# Patient Record
Sex: FEMALE | Race: WHITE
[De-identification: names, ages, dates, MRNs, and addresses within clinical notes are randomized per-mention and may not be internally consistent; named-entity substitution may affect disease eponyms.]

---

## 2017-02-24 ENCOUNTER — HOSPITAL ENCOUNTER (OUTPATIENT)
Dept: HOSPITAL 45 - C.MAMM | Age: 67
Discharge: HOME | End: 2017-02-24
Attending: FAMILY MEDICINE
Payer: COMMERCIAL

## 2017-02-24 DIAGNOSIS — Z12.31: Primary | ICD-10-CM

## 2017-02-24 NOTE — MAMMOGRAPHY REPORT
BILATERAL DIGITAL SCREENING MAMMOGRAM WITH CAD: 2/24/2017



TECHNIQUE:  Current study was also evaluated with a Computer Aided Detection (CAD) system.  Bilatera
l CC and MLO views were obtained.



COMPARISON: Comparison is made to exams dated:  10/6/2015 mammogram, 6/17/2013 mammogram - [facility], and 10/14/2009.   



BREAST COMPOSITION:  There are scattered areas of fibroglandular density in both breasts.  



FINDINGS:  No suspicious masses, calcifications, or areas of architectural distortion are noted in e
ither breast. There has been no significant interval change compared to prior exams.  





IMPRESSION:  ACR BI-RADS CATEGORY 1: NEGATIVE

There is no mammographic evidence of malignancy. A 1 year screening mammogram is recommended.  The p
atient will receive written notification of the results.  





Approximately 10% of breast cancers are not detected with mammography. A negative mammographic repor
t should not delay biopsy if a clinically suggestive mass is present.



Meghana Montano M.D.          

ah/:2/24/2017 13:58:27  



Imaging Technologist: Joann FAY(CHICO)(ABIEL), [facility]

letter sent: Normal 1/2  

BI-RADS Code: ACR BI-RADS Category 1: Negative

## 2018-04-13 ENCOUNTER — HOSPITAL ENCOUNTER (INPATIENT)
Dept: HOSPITAL 45 - C.EDB | Age: 68
LOS: 1 days | Discharge: HOME | DRG: 65 | End: 2018-04-14
Attending: HOSPITALIST | Admitting: HOSPITALIST
Payer: COMMERCIAL

## 2018-04-13 VITALS
SYSTOLIC BLOOD PRESSURE: 129 MMHG | HEART RATE: 89 BPM | OXYGEN SATURATION: 96 % | TEMPERATURE: 98.78 F | DIASTOLIC BLOOD PRESSURE: 80 MMHG

## 2018-04-13 VITALS
SYSTOLIC BLOOD PRESSURE: 112 MMHG | OXYGEN SATURATION: 96 % | TEMPERATURE: 98.6 F | HEART RATE: 86 BPM | DIASTOLIC BLOOD PRESSURE: 75 MMHG

## 2018-04-13 VITALS
TEMPERATURE: 98.24 F | DIASTOLIC BLOOD PRESSURE: 87 MMHG | OXYGEN SATURATION: 95 % | SYSTOLIC BLOOD PRESSURE: 134 MMHG | HEART RATE: 94 BPM

## 2018-04-13 VITALS
OXYGEN SATURATION: 97 % | HEART RATE: 80 BPM | TEMPERATURE: 98.42 F | DIASTOLIC BLOOD PRESSURE: 98 MMHG | SYSTOLIC BLOOD PRESSURE: 168 MMHG

## 2018-04-13 VITALS
WEIGHT: 176.81 LBS | HEIGHT: 70 IN | HEIGHT: 70 IN | BODY MASS INDEX: 25.31 KG/M2 | BODY MASS INDEX: 25.31 KG/M2 | WEIGHT: 176.81 LBS

## 2018-04-13 VITALS — OXYGEN SATURATION: 94 %

## 2018-04-13 VITALS — OXYGEN SATURATION: 96 %

## 2018-04-13 DIAGNOSIS — I10: ICD-10-CM

## 2018-04-13 DIAGNOSIS — Z88.1: ICD-10-CM

## 2018-04-13 DIAGNOSIS — Z88.2: ICD-10-CM

## 2018-04-13 DIAGNOSIS — Z87.891: ICD-10-CM

## 2018-04-13 DIAGNOSIS — H81.01: ICD-10-CM

## 2018-04-13 DIAGNOSIS — Z79.899: ICD-10-CM

## 2018-04-13 DIAGNOSIS — E78.5: ICD-10-CM

## 2018-04-13 DIAGNOSIS — Z88.8: ICD-10-CM

## 2018-04-13 DIAGNOSIS — Z82.3: ICD-10-CM

## 2018-04-13 DIAGNOSIS — Z86.73: ICD-10-CM

## 2018-04-13 DIAGNOSIS — I63.8: Primary | ICD-10-CM

## 2018-04-13 DIAGNOSIS — G81.91: ICD-10-CM

## 2018-04-13 LAB
BASOPHILS # BLD: 0.02 K/UL (ref 0–0.2)
BASOPHILS NFR BLD: 0.3 %
BUN SERPL-MCNC: 12 MG/DL (ref 7–18)
CA-I BLD-SCNC: 1.2 MMOL/L (ref 1.12–1.32)
CALCIUM SERPL-MCNC: 8.9 MG/DL (ref 8.5–10.1)
CK MB SERPL-MCNC: 1.8 NG/ML (ref 0.5–3.6)
CO2 SERPL-SCNC: 30 MMOL/L (ref 21–32)
CREAT BLD-MCNC: 0.9 MG/DL (ref 0.6–1.3)
CREAT SERPL-MCNC: 0.8 MG/DL (ref 0.6–1.2)
EOS ABS #: 0.16 K/UL (ref 0–0.5)
EOSINOPHIL NFR BLD AUTO: 231 K/UL (ref 130–400)
GLUCOSE SERPL-MCNC: 95 MG/DL (ref 70–99)
HBA1C MFR BLD: 5.8 % (ref 4.5–5.6)
HCT VFR BLD CALC: 42 % (ref 37–47)
HGB BLD-MCNC: 14.5 G/DL (ref 12–16)
IG#: 0.01 K/UL (ref 0–0.02)
IMM GRANULOCYTES NFR BLD AUTO: 41 %
INR PPP: 1 (ref 0.9–1.1)
ISTAT POTASSIUM: 3.5 MEQ/L (ref 3.3–5)
LYMPHOCYTES # BLD: 2.37 K/UL (ref 1.2–3.4)
MCH RBC QN AUTO: 30.4 PG (ref 25–34)
MCHC RBC AUTO-ENTMCNC: 34.5 G/DL (ref 32–36)
MCV RBC AUTO: 88.1 FL (ref 80–100)
MONO ABS #: 0.42 K/UL (ref 0.11–0.59)
MONOCYTES NFR BLD: 7.3 %
NEUT ABS #: 2.8 K/UL (ref 1.4–6.5)
NEUTROPHILS # BLD AUTO: 2.8 %
NEUTROPHILS NFR BLD AUTO: 48.4 %
PMV BLD AUTO: 11 FL (ref 7.4–10.4)
POTASSIUM SERPL-SCNC: 4.2 MMOL/L (ref 3.5–5.1)
PTT PATIENT: 27.8 SECONDS (ref 21–31)
RED CELL DISTRIBUTION WIDTH CV: 12.7 % (ref 11.5–14.5)
RED CELL DISTRIBUTION WIDTH SD: 40.8 FL (ref 36.4–46.3)
SODIUM BLD-SCNC: 139 MEQ/L (ref 135–144)
SODIUM SERPL-SCNC: 135 MMOL/L (ref 136–145)
WBC # BLD AUTO: 5.78 K/UL (ref 4.8–10.8)

## 2018-04-13 RX ADMIN — ATORVASTATIN CALCIUM SCH MG: 40 TABLET, FILM COATED ORAL at 17:10

## 2018-04-13 RX ADMIN — POTASSIUM CHLORIDE SCH MEQ: 750 TABLET, EXTENDED RELEASE ORAL at 20:26

## 2018-04-13 NOTE — DIAGNOSTIC IMAGING REPORT
CT OF THE HEAD WITHOUT CONTRAST



CLINICAL HISTORY: Stroke    



COMPARISON STUDY:  Head CT Suad 15, 2015 and MRI of the brain October 16, 2015. 



CT DOSE: 786.26 mGy.cm



TECHNIQUE: Helical axial images of the head were obtained without IV contrast.

Automated exposure control was utilized for the study.  A dose lowering

technique was utilized adhering to the principles of ALARA.





FINDINGS: No acute intracranial hemorrhage, midline shift or mass effect is

present. Ventricular system is normal. Basilar cisterns are patent. 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

thalamic infarct is noted. There is an old infarct within the left centrum

semiovale. White matter hypodensities are unchanged. There are no findings to

suggest acute dural sinus thrombosis or acute territorial infarct. There are no

significant calvarial abnormalities. Visualized portions of the sinuses and

mastoid air cells are clear.



IMPRESSION:  No acute intracranial findings. 







Electronically signed by:  Tacho Michael M.D.

4/13/2018 9:52 AM



Dictated Date/Time:  4/13/2018 9:43 AM

## 2018-04-13 NOTE — DIAGNOSTIC IMAGING REPORT
CTA ANGIOGRAPHY OF THE HEAD



CLINICAL HISTORY: Stroke symptoms.    



COMPARISON STUDY:  MRA of the head October 16, 2015.



TECHNIQUE:  Helical axial images of the head were obtained following uneventful

intravenous administration of 121 cc of Optiray 320.  A dose lowering technique

was utilized adhering to the principles of ALARA.



FINDINGS: The CTA of the neck will be reported separately. The bilateral M1, M2,

A1 and A2 segments are patent. There is moderate plaque within bilateral

cavernous carotids without significant stenosis. No abrupt vessel cut off is

identified. Note is made of a tiny 2 mm aneurysm arising from the undersurface

of the supraclinoid right ICA. No additional intracranial aneurysms are

identified. The right vertebral artery is dominant. The left vertebral artery is

diminutive. This is unchanged. A large left posterior communicating artery is

noted. No acute intracranial hemorrhage, midline shift or mass effect is

present. Ventricular system is normal. Basilar cisterns are patent. No

extra-axial collections are present.







IMPRESSION:  



1. No abrupt vessel cut off identified.



2. Tiny 2 mm aneurysm arising from the supraclinoid right ICA. No additional

intracranial aneurysms.



3. Moderate plaque within the bilateral cavernous carotids without

hemodynamically significant stenosis.







Electronically signed by:  Tacho Michael M.D.

4/13/2018 10:04 AM



Dictated Date/Time:  4/13/2018 9:55 AM

## 2018-04-13 NOTE — DIAGNOSTIC IMAGING REPORT
CT ANGIOGRAM OF THE NECK



CLINICAL HISTORY: Strokelike symptoms.



COMPARISON STUDY:  No priors.



TECHNIQUE: Following the IV administration of 121 of Optiray 320, CT angiogram

of the neck was performed from the aortic arch to the skull base. Images are

reviewed in the axial, sagittal, and coronal planes. 3-D MIPS images are created

and assessed. IV contrast was administered without complication. All

measurements were calculated based on NASCET criteria.  A dose lowering

technique was utilized adhering to the principles of ALARA.



CT DOSE: 527.68 mGy.cm



FINDINGS:



Thoracic aorta: There is moderate carotid calcification of the thoracic aorta.

Visualized portions of the thoracic aorta are normal in caliber. The aortic arch

demonstrates standard 3-vessel anatomy.



Right carotid arterial system: The right common carotid artery is widely patent,

as are the right internal and external carotid arteries. Minimal ulcerated

plaque is noted in the carotid bulb.



Left carotid arterial system: The left common carotid artery is widely patent,

as are the left internal and external carotid arteries.



Vertebral arteries: The vertebral arteries are widely patent bilaterally, noting

right-sided dominance.



Intracranial vasculature: Intracranial vessels are patent at the skull base. The

left vertebral artery is diminutive.



Subclavian arteries: Widely patent bilaterally.



Jugular veins: Widely patent bilaterally.



Brain parenchyma: The visualized brain parenchyma the skull base is within

normal limits.



Lung apices: Partially visualized upper lobe lung parenchyma appears clear.



Soft tissues: The visualized pharyngeal soft tissues are normal in appearance

noting angiographic phase technique. The oropharyngeal airway appears widely

patent. The salivary glands are normal in appearance. There is a 5 mm

low-attenuation nodule in the right thyroid lobe. No cervical lymphadenopathy is

seen.



Skeletal structures: The visualized calvarium at the skull base appears intact.

The imaged cervical spine is within normal limits.





IMPRESSION: Unremarkable CT angiogram of the neck.







Electronically signed by:  South Brunson M.D.

4/13/2018 10:06 AM



Dictated Date/Time:  4/13/2018 9:56 AM

## 2018-04-13 NOTE — DIAGNOSTIC IMAGING REPORT
CHEST ONE VIEW PORTABLE



CLINICAL HISTORY: Stroke    



COMPARISON STUDY:  Chest radiograph October 16, 2015.



FINDINGS: Dextroscoliosis of the upper thoracic spine is incidentally noted. No

pneumothorax or pleural effusion is noted. There is no evidence for pulmonary

edema. Cardiomediastinal silhouette is unremarkable. Appearance of the chest is

unchanged. 



IMPRESSION:  No acute cardiopulmonary findings. 









Electronically signed by:  Tacho Michael M.D.

4/13/2018 10:07 AM



Dictated Date/Time:  4/13/2018 10:07 AM

## 2018-04-13 NOTE — NEUROLOGY CONSULTATION
Neurology Consultation


Date of Consultation:


Apr 13, 2018.


Attending Physician:


Brenda Moore DO


Primary Care Physician:


Gurwinder Godinez D.OAlvaro


Reason for Consultation:


right sided weakness, TIA vs CVA


History of Present Illness


Source:  patient


Ashley is a 68 year old  female PMH Meniere's,  disease right ear with hearing 

loss, HTN, ICH  who presents to the ER with chief complaint of right-sided 

weakness.  She woke up feeling at her baseline. she was walking through [place] 

with her  and her  right leg started to not feel right.  She felt as 

though maybe the floor was slippery.  She went home to eat breakfast and 

noticed she couldn't hold her coffee cup with her right hand. When she would 

walk she was veering to the right. While in the ED the symptoms slowly started 

to resolve and she feel back to her baseline accept she feels tired. ad CT as 

well as CTA head and neck without acute findings. Stroke alert was called but 

she was given a full dose aspirin only due to a PMH of hemorrhagic bleed. 

denies CP, SOB, abdominal pain, weakness, numbness tingling, N, V, vision 

changes, slurred speech, swallowing issues, falls





Social History


Alcohol Use:  occasionally


Housing Status:  lives with family





Allergies


Coded Allergies:  


     Amoxicillin (Verified  Adverse Reaction, Intermediate, nausea, 10/16/15)


     Clavulanic Acid (Verified  Adverse Reaction, Intermediate, nausea, 10/16/15

)


     Prednisone (Verified  Adverse Reaction, Intermediate, nausea, 10/16/15)


     Sulfa Antibiotics (Verified  Adverse Reaction, Intermediate, nausea, 10/16/

15)


     Doxycycline (Unverified  Adverse Reaction, Unknown, nausea, 10/16/15)





Current Inpatient Medications





Current Inpatient Medications








 Medications


  (Trade)  Dose


 Ordered  Sig/Soha


 Route  Start Time


 Stop Time Status Last Admin


Dose Admin


 


 Ioversol


  (Optiray 300)  121 ml  UD  PRN


 IV  4/13/18 10:00


 4/17/18 09:59   


 


 


 Acetaminophen


  (Tylenol Tab)  650 mg  Q4H  PRN


 PO  4/13/18 11:45


 5/13/18 11:44   


 


 


 Ondansetron HCl


  (Zofran Inj)  4 mg  Q6H  PRN


 IV  4/13/18 11:45


 5/13/18 11:44   


 


 


 Aspirin


  (Ecotrin Tab)  81 mg  QAM


 PO  4/14/18 09:00


 5/14/18 08:59   


 


 


 Atorvastatin


 Calcium


  (Lipitor Tab)  40 mg  QAM


 PO  4/13/18 12:00


 5/13/18 11:59   


 


 


 Miscellaneous


 Information


  (Pharmacist


 Discharge Med Rec


 Consult)  1 ea  UD  PRN


 N/A  4/13/18 12:00


 5/13/18 11:59   


 


 


 Calcium/Vitamin D


  (Caltrate Plus


 Tab)  1 tab  DAILY


 PO  4/14/18 09:00


 5/14/18 08:59   


 


 


 Meclizine HCl


  (Antivert Tab)  25 mg  DAILYBD


 PO  4/13/18 16:15


 5/13/18 16:14   


 


 


 Multivitamins


  (Multivitamin


 Tab)  1 tab  DAILY


 PO  4/14/18 09:00


 5/14/18 08:59   


 


 


 Meclizine HCl


  (Antivert Tab)  12.5 mg  DAILY


 PO  4/14/18 09:00


 5/14/18 08:59   


 


 


 Fish Oil


  (Omega-3


  (Purified Fish


 Oil) Cap)  1 gm  DAILY


 PO  4/14/18 09:00


 5/14/18 08:59   


 


 


 Potassium Chloride


  (Klor-Con M10)  10 meq  BID


 PO  4/13/18 21:00


 5/13/18 20:59   


 











Physical Exam


Vital Signs (Past 24 Hrs):











  Date Time  Temp Pulse Resp B/P (MAP) Pulse Ox O2 Delivery O2 Flow Rate FiO2


 


4/13/18 12:30 36.9 80 18 168/98 (121) 97 Room Air  


 


4/13/18 11:56  76 17 141/89 94 Room Air  


 


4/13/18 11:00     96 Room Air  


 


4/13/18 10:56  79 20 161/92 96 Room Air  


 


4/13/18 10:10  87 22 184/96 96 Room Air  


 


4/13/18 10:06  93      


 


4/13/18 09:59  88 18 177/98 95 Room Air  


 


4/13/18 09:53     97 Room Air  


 


4/13/18 09:29 36.6 95 20 156/93 96 Room Air  








Physical Exam:


Constitutional:  appearance nourished, healthy and normal


Ears, Nose, Mouth and Throat: mucous membranes moist, no injection and skin 

normal, eyes normal


Cardiovascular: normal S-1 and S-2 and regular rate and rhythm


Respiratory: clear to auscultation (CTA) and no rales, rhonchi or wheeze


Musculoskeletal: no peripheral edema and good distal pulses


Skin: no stigmata of neurocutaneous disease noted and normal and intact


Eyes: extraocular muscles intact (EOMI) and pupils equal, round and reactive to 

light (PERRL)





NEUROLOGIC EXAMINATION: 


Mental status: 


Alert and interactive


Oriented to full date and location


Oriented to person


Speech fluent with no evidence of aphasia





Cranial Nerves


smile eye brow raise symmetric





Reflexes:


Deep tendon reflexes were symmetrical and graded 2/5.  Plantar responses were 

flexor.





Sensory: 


cool touch, vibration





Coordination: 


Romberg absent, finger to nose with no bi pass





Gait/Stance:


Posture normal.  Gait normal: with steady with steps, base, turning, heel and 

toe walking (mild difficulty) and tandem gait.





Motor:


Negative for pronator drift of out stretched arms with eyes closed.





Strength:


biceps, triceps, hand  intrinsics, bilaterally 5/5, hip flex plantar flex 

ext 5/5 bilaterally





Laboratory Results


Past 24 Hours:


4/13/18 09:38








Red Blood Count 4.77, Mean Corpuscular Volume 88.1, Mean Corpuscular Hemoglobin 

30.4, Mean Corpuscular Hemoglobin Concent 34.5, Mean Platelet Volume 11.0, 

Neutrophils (%) (Auto) 48.4, Lymphocytes (%) (Auto) 41.0, Monocytes (%) (Auto) 

7.3, Eosinophils (%) (Auto) 2.8, Basophils (%) (Auto) 0.3, Neutrophils # (Auto) 

2.80, Lymphocytes # (Auto) 2.37, Monocytes # (Auto) 0.42, Eosinophils # (Auto) 

0.16, Basophils # (Auto) 0.02





4/13/18 09:56

















Test


  4/13/18


09:36 4/13/18


09:38 4/13/18


09:52 4/13/18


09:56


 


Bedside Hemoglobin


  15.0 g/dl


(12.0-16.0) 


  


  


 


 


Bedside Hematocrit 44 % (37-47)    


 


Bedside Sodium


  139 mEq/L


(135-144) 


  


  


 


 


Bedside Potassium


  3.5 mEq/L


(3.3-5.0) 


  


  


 


 


Bedside Chloride


  97 mEq/L


(101-112) 


  


  


 


 


Bedside Total CO2


  31 mEq/l


(24-31) 


  


  


 


 


Bedside Blood Urea Nitrogen


  12 mg/dl


(7-18) 


  


  


 


 


Bedside Creatinine


  0.9 mg/dl


(0.6-1.3) 


  


  


 


 


Bedside Glucose (other)


  111 mg/dl


(70-99) 


  


  


 


 


Bedside Ionized Calcium (Richard)


  1.20 mmol/l


(1.12-1.32) 


  


  


 


 


White Blood Count


  


  5.78 K/uL


(4.8-10.8) 


  


 


 


Red Blood Count


  


  4.77 M/uL


(4.2-5.4) 


  


 


 


Hemoglobin


  


  14.5 g/dL


(12.0-16.0) 


  


 


 


Hematocrit  42.0 % (37-47)   


 


Mean Corpuscular Volume


  


  88.1 fL


() 


  


 


 


Mean Corpuscular Hemoglobin


  


  30.4 pg


(25-34) 


  


 


 


Mean Corpuscular Hemoglobin


Concent 


  34.5 g/dl


(32-36) 


  


 


 


Platelet Count


  


  231 K/uL


(130-400) 


  


 


 


Mean Platelet Volume


  


  11.0 fL


(7.4-10.4) 


  


 


 


Neutrophils (%) (Auto)  48.4 %   


 


Lymphocytes (%) (Auto)  41.0 %   


 


Monocytes (%) (Auto)  7.3 %   


 


Eosinophils (%) (Auto)  2.8 %   


 


Basophils (%) (Auto)  0.3 %   


 


Neutrophils # (Auto)


  


  2.80 K/uL


(1.4-6.5) 


  


 


 


Lymphocytes # (Auto)


  


  2.37 K/uL


(1.2-3.4) 


  


 


 


Monocytes # (Auto)


  


  0.42 K/uL


(0.11-0.59) 


  


 


 


Eosinophils # (Auto)


  


  0.16 K/uL


(0-0.5) 


  


 


 


Basophils # (Auto)


  


  0.02 K/uL


(0-0.2) 


  


 


 


RDW Standard Deviation


  


  40.8 fL


(36.4-46.3) 


  


 


 


RDW Coefficient of Variation


  


  12.7 %


(11.5-14.5) 


  


 


 


Immature Granulocyte % (Auto)  0.2 %   


 


Immature Granulocyte # (Auto)


  


  0.01 K/uL


(0.00-0.02) 


  


 


 


Prothrombin Time


  


  10.7 SECONDS


(9.0-12.0) 


  


 


 


Prothromb Time International


Ratio 


  1.0 (0.9-1.1) 


  


  


 


 


Activated Partial


Thromboplast Time 


  27.8 SECONDS


(21.0-31.0) 


  


 


 


Partial Thromboplastin Ratio  1.1   


 


Estimated Average Glucose  120 mg/dl   


 


Hemoglobin A1c


  


  5.8 %


(4.5-5.6) 


  


 


 


Bedside Prothrombin Time INR   1.1 (0.9-1.1)  


 


Anion Gap


  


  


  


  4.0 mmol/L


(3-11)


 


Est Creatinine Clear Calc


Drug Dose 


  


  


  80.4 ml/min 


 


 


Estimated GFR (


American) 


  


  


  88.4 


 


 


Estimated GFR (Non-


American 


  


  


  76.3 


 


 


BUN/Creatinine Ratio    14.5 (10-20) 


 


Calcium Level


  


  


  


  8.9 mg/dl


(8.5-10.1)


 


Magnesium Level


  


  


  


  2.0 mg/dl


(1.8-2.4)


 


Total Creatine Kinase


  


  


  


  104 U/L


()


 


Creatine Kinase MB


  


  


  


  1.8 ng/ml


(0.5-3.6)


 


Creatine Kinase MB Ratio    1.7 (0-3.0) 


 


Troponin I


  


  


  


  < 0.015 ng/ml


(0-0.045)











Imaging


TTE- 


* Injection of contrast documented no interatrial shunt.


* The interatrial septum is intact with no evidence for an atrial septal defect.


* The left ventricle is normal in size.


* Left ventricular systolic function is normal.


* Ejection Fraction = 55-60%.


* The right ventricular systolic function is normal.


* The left atrial size is normal.


* Right atrial size is normal.


* No significant valvular pathology.


CTA head-. No abrupt vessel cut off identified.  Tiny 2 mm aneurysm arising 

from the supraclinoid right ICA. No additional intracranial aneurysms.  

Moderate plaque within the bilateral cavernous carotids without hemodynamically 

significant stenosis.


CTA neck- Unremarkable CT angiogram of the neck.


CT head- No acute intracranial findings





Impression


68 year old female with right sided weakness- resolved





Plan


1. PMH ICH- not on aspirin at home -plavix not started


2. MRI brain pending


3. CTA head and neck- no acute findings


4. TTE - no ASD


5. optimize HTN, DL LDL<70


6. PT/OT appears to be no ongoing need


7. further recommendations to follow








I have seen and discussed above patient with Dr Mi Whitfield, neurology


Pt seen and examined, Hx of what I presume to be a htn L thal hem. Pt denies 

residua. Had transient sx rue and rle.Suspect TIA, REc MRI, cardiovasc salazar. george/





MD Tia

## 2018-04-13 NOTE — ECHOCARDIOGRAM REPORT
*NOTICE TO RECEIVING PARTY AGENCY**  This information is strictly Confidential and protected under 
[place] law.  [place] law prohibits you from making any further disclosure of this 
information unless further disclosure is expressly permitted by the written consent of the person to 
whom it pertains or is authorized by law.  A general authorization for the release of medical or 
other information is not sufficient for this purpose.  Hospital accepts no responsibility if the 
information is made available to any other person, INCLUDING THE PATIENT.



Interpretation Summary

  *  Name: JENAE BARBER  Study Date: 2018 01:11 PM  BP: 168/98 mmHg

  *  MRN: [id number]  Patient Location: [facility]  HR: 80

  *  : 1950 (M/d/yyyy)  Gender: Female  Height: 70 in

  *  Age: 68 yrs  Ethnicity: [place]  Weight: 184 lb

  *  Ordering Physician: Albina Peterson

  *  Performed By: Gabrielle Aly RDCS

  *  Accession# [id number]  Account# [id number]

  *  Reason For Study: Cerebral Vascular Attack

  *  BSA: 2.0 m2

  *  -- Conclusions --

  *  Injection of contrast documented no interatrial shunt.

  *  The interatrial septum is intact with no evidence for an atrial septal defect.

  *  The left ventricle is normal in size.

  *  Left ventricular systolic function is normal.

  *  Ejection Fraction = 55-60%.

  *  The right ventricular systolic function is normal.

  *  The left atrial size is normal.

  *  Right atrial size is normal.

  *  No significant valvular pathology.

Procedure Details

  *  A complete two-dimensional transthoracic echocardiogram was performed (2D, M-mode, Doppler and 
color flow Doppler).

  *  A saline contrast injection was performed to assess for cardiac shunting.

  *  The injection was performed through an intravenous line in the left arm.

  *  The attending nurse who injected the saline contrast was Leann De Guzman RN.

  *  A total of 20 cc of agitated saline was given.

Left Ventricle

  *  The left ventricle is normal in size.

  *  There is normal left ventricular wall thickness.

  *  Ejection Fraction = 55-60%.

  *  Left ventricular systolic function is normal.

Right Ventricle

  *  The right ventricle is normal size.

  *  The right ventricular systolic function is normal.

Atria

  *  The left atrial size is normal.

  *  Right atrial size is normal.

  *  Injection of contrast documented no interatrial shunt.

  *  The interatrial septum is intact with no evidence for an atrial septal defect.

Mitral Valve

  *  The mitral valve anatomy is normal.

  *  Significant mitral regurgitation is absent.

Tricuspid Valve

  *  The tricuspid valve anatomy is normal.

  *  Significant tricuspid regurgitation is absent.

Aortic Valve

  *  The aortic valve is tricuspid.  The leaflet thickness if normal. There is no aortic stenosis, 
and no significant insufficiency.

Pulmonic Valve

  *  The pulmonic valve is not well visualized.

Great Vessels

  *  The aortic root and proximal ascending aorta are normal sized.

Pericardium/Pleural

  *  There is no pericardial effusion.



MMode 2D Measurements and Calculations

IVSd 0.85 cm

IVSs 0.93 cm



LVIDd 4.0 cm

LVIDs 2.8 cm

LVPWd 0.71 cm

LVPWs 1.4 cm



IVS/LVPW 1.2 

FS 29.9 %

EDV(Teich) 69.6 ml

ESV(Teich) 29.5 ml

EF(Teich) 57.6 %



EDV(cubed) 63.6 ml

ESV(cubed) 21.9 ml

EF(cubed) 65.5 %

% IVS thick 9.4 %

% LVPW thick 97.4 %





LV mass(C)d 90.1 grams

LV mass(C)dI 44.7 grams/m\S\2

LV mass(C)s 94.9 grams

LV mass(C)sI 47.1 grams/m\S\2



SV(Teich) 40.1 ml

SI(Teich) 19.9 ml/m\S\2

SV(cubed) 41.6 ml

SI(cubed) 20.7 ml/m\S\2



Ao root diam 2.9 cm

Ao root area 6.8 cm\S\2

ACS 2.0 cm

LA dimension 3.4 cm



LA/Ao 1.1 





LVAd ap4 25.2 cm\S\2

LVLd ap4 7.9 cm

EDV(MOD-sp4) 66.5 ml

EDV(sp4-el) 68.4 ml

LVAs ap4 14.7 cm\S\2

LVLs ap4 6.4 cm

ESV(MOD-sp4) 29.2 ml

ESV(sp4-el) 28.8 ml

EF(MOD-sp4) 56.1 %

EF(sp4-el) 57.9 %



LVAd ap2 22.2 cm\S\2

LVLd ap2 7.6 cm

EDV(MOD-sp2) 57.5 ml

EDV(sp2-el) 55.2 ml

LVAs ap2 13.0 cm\S\2

LVLs ap2 6.5 cm

ESV(MOD-sp2) 23.0 ml

ESV(sp2-el) 22.1 ml

EF(MOD-sp2) 60.0 %

EF(sp2-el) 59.9 %



LVLd %diff -3.98 %

EDV(MOD-bp) 62.2 ml

LVLs %diff 1.2 %

ESV(MOD-bp) 26.1 ml

EF(MOD-bp) 58.0 %



SV(MOD-sp4) 37.3 ml

SI(MOD-sp4) 18.5 ml/m\S\2





SV(MOD-sp2) 34.5 ml

SI(MOD-sp2) 17.1 ml/m\S\2



SV(MOD-bp) 36.1 ml

SI(MOD-bp) 17.9 ml/m\S\2



SV(sp4-el) 39.6 ml

SI(sp4-el) 19.7 ml/m\S\2



SV(sp2-el) 33.1 ml

SI(sp2-el) 16.4 ml/m\S\2







Doppler Measurements and Calculations

MV E max suleiman 62.3 cm/sec

MV A max suleiman 108.0 cm/sec



MV E/A 0.58 



MV dec time 0.32 sec



Ao V2 max 122.8 cm/sec

Ao max PG 6.0 mmHg

Ao max PG (full) 3.5 mmHg





LV V1 max PG 2.5 mmHg



LV V1 max 79.6 cm/sec



PA V2 max 64.2 cm/sec

PA max PG 1.6 mmHg



TR max suleiman 176.0 cm/sec

## 2018-04-13 NOTE — EMERGENCY ROOM VISIT NOTE
History


Report prepared by Gavin:  Loly Eldridge


Under the Supervision of:  Dr. Aj Rocha D.O.


First contact with patient:  09:30


Chief Complaint:  STROKE SYMPTOMS


Stated Complaint:  POSSIBLE STROKE SYMPTOMS





History of Present Illness


The patient is a 68 year old female who presents to the Emergency Room with 

complaints of sudden weakness in her right leg and right arm beginning at 8 am 

this morning, an hour and a half ago. The patient states "I believe I might be 

having a stroke".  She notes that at 8 AM she has weakness in her right upper 

and right lower extremity.  Nothing makes this better or worse.  She has 

trouble with fine motor.  She is able to ambulate but notes that she falls to 

the side.  She has never had this before.  This did come on suddenly and has 

been persistent since 8am.  per , the patient's speech sounds normal. 

The patient states she was walking through [employer] when she started to feel "off 

balance". Pt denies headache, change in vision, fevers, chest pain, shortness 

of breath, nausea, vomiting, diarrhea, pain with urination, and melena. The 

patient denies any history of A-fib. The patient notes a history of a brain 

bleed which she was life flighted to [facility] in 2006. She states she is 

unsure of what the cause of her brain bleed was. The patient had an MRI 

yesterday because of her history of Meniere's disease.





   Source of History:  patient


   Onset:  8 am


   Position:  other (right sided)


   Quality:  other (weakness)


   Timing:  other (sudden)


   Associated Symptoms:  + weakness, No fevers, No headache, No chest pain, No 

SOB, No nausea, No vomiting, No diarrhea





Review of Systems


See HPI for pertinent positives & negatives. A total of 10 systems reviewed and 

were otherwise negative.





Past Medical & Surgical


Medical Problems:


(1) Dyslipidemia


(2) Hypertension


(3) ICH (intracerebral hemorrhage)


(4) Mnire's disease


Surgical Problems:


(1) H/O dilation and curettage


(2) H/O tubal ligation


(3) Hx of appendectomy


(4) Hx of tonsillectomy








Family History





Cancer


Gallbladder disease


Hypertension





Social History


Smoking Status:  Former Smoker


Alcohol Use:  none


Marital Status:  


Housing Status:  lives with family


Occupation Status:  employed





Current/Historical Medications


Scheduled


Calcium/Vitamin D (Caltrate 600 Plus *), 1 TAB PO DAILY


Enalapril Maleate (Vasotec), 20 MG PO BID


Garlic (Garlic), 1 CAP PO BID


Hydrochlorothiazide (Hydrochlorothiazide), 25 MG PO DAILY


Meclizine HCl (Meclizine HCl), 1 TAB PO DAILY


Meclizine Hcl (Meclizine Hcl), 1 TAB PO DAILYBD


Mometasone Furoate (Nasal) (Nasonex), 2 SPRY YUNIEL DAILY


Multiple Vitamin (Multivitamin), 1 TAB PO DAILY


Omega-3 Fatty Acids (Omega-3), 500 MG PO DAILY


Plant Sterols And Stanols (Cholest Off), 2 TAB PO DAILY


Potassium Chloride (Potassium Chloride Er), 10 MEQ PO BID





Allergies


Coded Allergies:  


     Amoxicillin (Verified  Adverse Reaction, Intermediate, nausea, 10/16/15)


     Clavulanic Acid (Verified  Adverse Reaction, Intermediate, nausea, 10/16/15

)


     Prednisone (Verified  Adverse Reaction, Intermediate, nausea, 10/16/15)


     Sulfa Antibiotics (Verified  Adverse Reaction, Intermediate, nausea, 10/16/

15)


     Doxycycline (Unverified  Adverse Reaction, Unknown, nausea, 10/16/15)





Physical Exam


Vital Signs











  Date Time  Temp Pulse Resp B/P (MAP) Pulse Ox O2 Delivery O2 Flow Rate FiO2


 


4/13/18 11:00     96 Room Air  


 


4/13/18 10:56  79 20 161/92 96 Room Air  


 


4/13/18 10:10  87 22 184/96 96 Room Air  


 


4/13/18 10:06  93      


 


4/13/18 09:59  88 18 177/98 95 Room Air  


 


4/13/18 09:53     97 Room Air  


 


4/13/18 09:29 36.6 95 20 156/93 96 Room Air  











Physical Exam


GENERAL: Sitting up in bed, alert, anxious appearing, well nourished, no 

distress, non-toxic 


EYE EXAM: normal conjunctiva. PERRL and EOM's intact.


OROPHARYNX: no exudate, no erythema, lips, buccal mucosa, and tongue normal and 

mucous membranes are moist


NECK: supple, no nuchal rigidity, no adenopathy, non-tender


LUNGS: Clear to auscultation. Normal chest wall mechanics


HEART: no murmurs, S1 normal and S2 normal 


ABDOMEN: abdomen soft, non-tender, normo-active bowel sounds, no masses, no 

rebound or guarding. 


BACK: Back is symmetrical on inspection and there is no deformity, no midline 

tenderness, no CVA tenderness. 


SKIN: no rashes and no bruising 


UPPER EXTREMITIES: upper extremities are grossly normal. 


LOWER EXTREMITIES: No pitting edema. 


NEURO EXAM:Awake, alert, following commands, slight weakness with flexion and 

extension and grasp of right upper extremity, positive drift on right. Slight 

weakness with flexion in right hip and with plantar and dorsal flexion.  No 

weakness noted on left side.  Cranial nerves II through XII are intact.





Medical Decision & Procedures


ER Provider


Diagnostic Interpretation:


MRI IAC WITHOUT AND WITH CONTRAST from 4/12/18


Impression: 


1. No evidence for abnormality in each temporal bone region.


2. Chronic microvascular changes 


3. Sequela of remote hemorrhage in the left basal ganglia and posterior limb on 

prior MRI 2006 study.  Adjacent unchanged gliosis in the the left corona 

radiata. 





Radiology results as stated below per my review and the radiologist's 

interpretation: 





CTA ANGIOGRAPHY OF THE HEAD





CLINICAL HISTORY: Stroke symptoms.    





COMPARISON STUDY:  MRA of the head October 16, 2015.





TECHNIQUE:  Helical axial images of the head were obtained following uneventful


intravenous administration of 121 cc of Optiray 320.  A dose lowering technique


was utilized adhering to the principles of ALARA.





FINDINGS: The CTA of the neck will be reported separately. The bilateral M1, M2,


A1 and A2 segments are patent. There is moderate plaque within bilateral


cavernous carotids without significant stenosis. No abrupt vessel cut off is


identified. Note is made of a tiny 2 mm aneurysm arising from the undersurface


of the supraclinoid right ICA. No additional intracranial aneurysms are


identified. The right vertebral artery is dominant. The left vertebral artery is


diminutive. This is unchanged. A large left posterior communicating artery is


noted. No acute intracranial hemorrhage, midline shift or mass effect is


present. Ventricular system is normal. Basilar cisterns are patent. No


extra-axial collections are present.











IMPRESSION:  





1. No abrupt vessel cut off identified.





2. Tiny 2 mm aneurysm arising from the supraclinoid right ICA. No additional


intracranial aneurysms.





3. Moderate plaque within the bilateral cavernous carotids without


hemodynamically significant stenosis.











Electronically signed by:  Tacho Michael M.D.





CT ANGIOGRAM OF THE NECK





CLINICAL HISTORY: Strokelike symptoms.





COMPARISON STUDY:  No priors.





TECHNIQUE: Following the IV administration of 121 of Optiray 320, CT angiogram


of the neck was performed from the aortic arch to the skull base. Images are


reviewed in the axial, sagittal, and coronal planes. 3-D MIPS images are created


and assessed. IV contrast was administered without complication. All


measurements were calculated based on NASCET criteria.  A dose lowering


technique was utilized adhering to the principles of ALARA.





CT DOSE: 527.68 mGy.cm





FINDINGS:





Thoracic aorta: There is moderate carotid calcification of the thoracic aorta.


Visualized portions of the thoracic aorta are normal in caliber. The aortic arch


demonstrates standard 3-vessel anatomy.





Right carotid arterial system: The right common carotid artery is widely patent,


as are the right internal and external carotid arteries. Minimal ulcerated


plaque is noted in the carotid bulb.





Left carotid arterial system: The left common carotid artery is widely patent,


as are the left internal and external carotid arteries.





Vertebral arteries: The vertebral arteries are widely patent bilaterally, noting


right-sided dominance.





Intracranial vasculature: Intracranial vessels are patent at the skull base. The


left vertebral artery is diminutive.





Subclavian arteries: Widely patent bilaterally.





Jugular veins: Widely patent bilaterally.





Brain parenchyma: The visualized brain parenchyma the skull base is within


normal limits.





Lung apices: Partially visualized upper lobe lung parenchyma appears clear.





Soft tissues: The visualized pharyngeal soft tissues are normal in appearance


noting angiographic phase technique. The oropharyngeal airway appears widely


patent. The salivary glands are normal in appearance. There is a 5 mm


low-attenuation nodule in the right thyroid lobe. No cervical lymphadenopathy is


seen.





Skeletal structures: The visualized calvarium at the skull base appears intact.


The imaged cervical spine is within normal limits.








IMPRESSION: Unremarkable CT angiogram of the neck.








Electronically signed by:  South Brunson M.D.





CHEST ONE VIEW PORTABLE





CLINICAL HISTORY: Stroke    





COMPARISON STUDY:  Chest radiograph October 16, 2015.





FINDINGS: Dextroscoliosis of the upper thoracic spine is incidentally noted. No


pneumothorax or pleural effusion is noted. There is no evidence for pulmonary


edema. Cardiomediastinal silhouette is unremarkable. Appearance of the chest is


unchanged. 





IMPRESSION:  No acute cardiopulmonary findings. 














Electronically signed by:  Tacho Michael M.D.








CT OF THE HEAD WITHOUT CONTRAST





CLINICAL HISTORY: Stroke    





COMPARISON STUDY:  Head CT Suad 15, 2015 and MRI of the brain October 16, 2015. 





CT DOSE: 786.26 mGy.cm





TECHNIQUE: Helical axial images of the head were obtained without IV contrast.


Automated exposure control was utilized for the study.  A dose lowering


technique was utilized adhering to the principles of ALARA.








FINDINGS: No acute intracranial hemorrhage, midline shift or mass effect is


present. Ventricular system is normal. Basilar cisterns are patent. An old left


thalamic infarct is noted. There is an old infarct within the left centrum


semiovale. White matter hypodensities are unchanged. There are no findings to


suggest acute dural sinus thrombosis or acute territorial infarct. There are no


significant calvarial abnormalities. Visualized portions of the sinuses and


mastoid air cells are clear.





IMPRESSION:  No acute intracranial findings. 











Electronically signed by:  Tacho Michael M.D.





Laboratory Results


4/13/18 09:38








Red Blood Count 4.77, Mean Corpuscular Volume 88.1, Mean Corpuscular Hemoglobin 

30.4, Mean Corpuscular Hemoglobin Concent 34.5, Mean Platelet Volume 11.0, 

Neutrophils (%) (Auto) 48.4, Lymphocytes (%) (Auto) 41.0, Monocytes (%) (Auto) 

7.3, Eosinophils (%) (Auto) 2.8, Basophils (%) (Auto) 0.3, Neutrophils # (Auto) 

2.80, Lymphocytes # (Auto) 2.37, Monocytes # (Auto) 0.42, Eosinophils # (Auto) 

0.16, Basophils # (Auto) 0.02





4/13/18 09:56

















Test


  4/13/18


09:36 4/13/18


09:38 4/13/18


09:52 4/13/18


09:56


 


Bedside Hemoglobin


  15.0 g/dl


(12.0-16.0) 


  


  


 


 


Bedside Hematocrit 44 % (37-47)    


 


Bedside Sodium


  139 mEq/L


(135-144) 


  


  


 


 


Bedside Potassium


  3.5 mEq/L


(3.3-5.0) 


  


  


 


 


Bedside Chloride


  97 mEq/L


(101-112) 


  


  


 


 


Bedside Total CO2


  31 mEq/l


(24-31) 


  


  


 


 


Bedside Blood Urea Nitrogen


  12 mg/dl


(7-18) 


  


  


 


 


Bedside Creatinine


  0.9 mg/dl


(0.6-1.3) 


  


  


 


 


Bedside Glucose (other)


  111 mg/dl


(70-99) 


  


  


 


 


Bedside Ionized Calcium (Richard)


  1.20 mmol/l


(1.12-1.32) 


  


  


 


 


White Blood Count


  


  5.78 K/uL


(4.8-10.8) 


  


 


 


Red Blood Count


  


  4.77 M/uL


(4.2-5.4) 


  


 


 


Hemoglobin


  


  14.5 g/dL


(12.0-16.0) 


  


 


 


Hematocrit  42.0 % (37-47)   


 


Mean Corpuscular Volume


  


  88.1 fL


() 


  


 


 


Mean Corpuscular Hemoglobin


  


  30.4 pg


(25-34) 


  


 


 


Mean Corpuscular Hemoglobin


Concent 


  34.5 g/dl


(32-36) 


  


 


 


Platelet Count


  


  231 K/uL


(130-400) 


  


 


 


Mean Platelet Volume


  


  11.0 fL


(7.4-10.4) 


  


 


 


Neutrophils (%) (Auto)  48.4 %   


 


Lymphocytes (%) (Auto)  41.0 %   


 


Monocytes (%) (Auto)  7.3 %   


 


Eosinophils (%) (Auto)  2.8 %   


 


Basophils (%) (Auto)  0.3 %   


 


Neutrophils # (Auto)


  


  2.80 K/uL


(1.4-6.5) 


  


 


 


Lymphocytes # (Auto)


  


  2.37 K/uL


(1.2-3.4) 


  


 


 


Monocytes # (Auto)


  


  0.42 K/uL


(0.11-0.59) 


  


 


 


Eosinophils # (Auto)


  


  0.16 K/uL


(0-0.5) 


  


 


 


Basophils # (Auto)


  


  0.02 K/uL


(0-0.2) 


  


 


 


RDW Standard Deviation


  


  40.8 fL


(36.4-46.3) 


  


 


 


RDW Coefficient of Variation


  


  12.7 %


(11.5-14.5) 


  


 


 


Immature Granulocyte % (Auto)  0.2 %   


 


Immature Granulocyte # (Auto)


  


  0.01 K/uL


(0.00-0.02) 


  


 


 


Prothrombin Time


  


  10.7 SECONDS


(9.0-12.0) 


  


 


 


Prothromb Time International


Ratio 


  1.0 (0.9-1.1) 


  


  


 


 


Activated Partial


Thromboplast Time 


  27.8 SECONDS


(21.0-31.0) 


  


 


 


Partial Thromboplastin Ratio  1.1   


 


Estimated Average Glucose  120 mg/dl   


 


Hemoglobin A1c


  


  5.8 %


(4.5-5.6) 


  


 


 


Bedside Prothrombin Time INR   1.1 (0.9-1.1)  


 


Anion Gap


  


  


  


  4.0 mmol/L


(3-11)


 


Est Creatinine Clear Calc


Drug Dose 


  


  


  80.4 ml/min 


 


 


Estimated GFR (


American) 


  


  


  88.4 


 


 


Estimated GFR (Non-


American 


  


  


  76.3 


 


 


BUN/Creatinine Ratio    14.5 (10-20) 


 


Calcium Level


  


  


  


  8.9 mg/dl


(8.5-10.1)


 


Magnesium Level


  


  


  


  2.0 mg/dl


(1.8-2.4)


 


Total Creatine Kinase


  


  


  


  104 U/L


()


 


Creatine Kinase MB


  


  


  


  1.8 ng/ml


(0.5-3.6)


 


Creatine Kinase MB Ratio    1.7 (0-3.0) 


 


Troponin I


  


  


  


  < 0.015 ng/ml


(0-0.045)











Medications Administered











 Medications


  (Trade)  Dose


 Ordered  Sig/Soha


 Route  Start Time


 Stop Time Status Last Admin


Dose Admin


 


 Sodium Chloride  1,000 ml @ 


 50 mls/hr  Q20H


 IV  4/13/18 09:33


 4/13/18 13:00 DC 4/13/18 10:02


50 MLS/HR


 


 Aspirin


  (Aspirin Chew)  324 mg  NOW  STAT


 PO  4/13/18 10:15


 4/13/18 10:16 DC 4/13/18 10:22


324 MG











ECG Per My Interpretation


Indication:  weakness


Rate (beats per minute):  85


Rhythm:  normal sinus


Findings:  other (normal axis, poor baseline in inferior and lateal, 

questionable st depression in lateral )





ED Course


ED COURSE: 


Vital signs were reviewed and showed hypertensive


The patients medical record was reviewed


The above diagnostic studies were performed and reviewed.


ED treatments and interventions as stated above. 





0931: The patient was evaluated in room A12B. A complete history and physical 

examination was performed. Review of EMR shows the patient had a left thalamic 

bleed in 2006. 





0933: Ordered Sodium Chloride 1000 ml @ 50 mls/hr IV. 





0947: I reviewed the patient's case with Dr. Ji Fitzgerald. 





1013: I reviewed the patient's case with Dr. Ji Fitzgerald. She 

said not to give the patient tPA due to her previous brain bleed. 





1015: Ordered Aspirin 324 mg PO. 





1024: I updated the patient on her test results. 





1038: I reviewed the patient's case with Dr. Eddie Lerner.  He will 

evaluate the patient for further management.





1045: Upon reevaluation, the patient is resting.I discussed my findings with 

the patient and she understands and agrees with the treatment plan.   


Based on the patients age, coexisting illnesses, exam and lab findings the 

decision to treat as an inpatient was made.


The patient remained stable while under my care.


The patient will be evaluated for further management.





Medical Decision


Differential Diagnosis includes but is not limited to ischemic Stroke, 

hemorrhagic stroke, bells palsy, mass, neoplasm, migraine headache, seizure, 

subarachnoid hemorrhage, TIA, and transient global amnesia.  





Patient is a 68-year-old female who presents the ER for sudden onset of right-

sided weakness at 8 AM.  Upon arrival stroke alert was called.  Patient was 

taken to the CAT scan where CT head was performed.  Her history with a previous 

hemorrhagic CVA in 2006 was reviewed.  I do not believe that this was a 

hypertensive bleed as her blood pressures at that time were 150s.  Because of 

this I did perform a CTA is a favored she would likely not be a thrombolytic 

candidate.  Did discuss with [facility].  Patient was evaluated at 

bedside by them.  Decision was made in combination with the patient myself and 

[facility] not to give TPA with the previous brain bleed.  CBC along 

with BMP, LFTs, bilirubin and troponin was negative.  EKG had a poor baseline 

but nonischemic.  INR was normal.  Patient was given fluids and aspirin 

following passing the swallowing screen.  Patient was admitted to internal 

medicine with a CVA.





Medication Reconcilliation


Current Medication List:  was personally reviewed by me





Blood Pressure Screening


Patient's blood pressure:  Elevated blood pressure


Blood pressure disposition:  Elevated BP felt to be situational





Consults


Time Called:  0942


Consulting Physician:  Dr. Workman Telestroke


Returned Call:  [phone]


I reviewed the patient's case with Dr. Workman Telestroke.


Additional Consults:  


   Time Called:  1030


   Consulted Physician:  Dr. Eddie Lerner


   Returned Call:  [phone]


Additional Comments:


I reviewed the patient's case with Dr. Eddie Lerner.  He will evaluate 

the patient for further management.





Impression





 Primary Impression:  


 CVA (cerebral vascular accident)





Scribe Attestation


The scribe's documentation has been prepared under my direction and personally 

reviewed by me in its entirety. I confirm that the note above accurately 

reflects all work, treatment, procedures, and medical decision making performed 

by me.





Departure Information


Dispostion


Being Evaluated By Hospitalist





Referrals


Gurwinder Godinez D.O. (PCP)





Patient Instructions


My [facility]





Stroke History


Time Last Known Well


8 am





Stroke t-PA Criteria Reviewed


Does NOT meet criteria for t-PA





Reason t-PA Not Given


Contraindicated





Problem Qualifiers








 Primary Impression:  


 CVA (cerebral vascular accident)


 CVA mechanism:  unspecified  Qualified Codes:  I63.9 - Cerebral infarction, 

unspecified

## 2018-04-13 NOTE — HISTORY AND PHYSICAL
History & Physical


Date & Time of Service:


Apr 13, 2018 ~ 11:15


Chief Complaint:


Right Sided Weakness


Primary Care Physician:


Gurwinder Godinez D.O.


History of Present Illness


68-year-old female who presents to the ER with chief complaint of right-sided 

weakness.  Patient reports she woke up feeling in her usual state of health.  

She was walking through a store when she reports her right leg started to not 

feel right.  She felt as though maybe the floor was slippery.  She then went 

home and noticed that her right arm started to become weak.  She had trouble 

holding her coffee cup with her right hand.  She also reports some difficulty 

walking noting that her right leg was weak and that she was falling to the 

right side.  Patient denies slurred speech, facial droop, or dysphasia.  No 

headache or blurred vision.  She denies chest pain and shortness of breath.  No 

lightheadedness, dizziness, diaphoresis, or syncopal events.  She denies 

abdominal pain, nausea, vomiting, diarrhea.  No other recent illnesses, fevers, 

or chills.  She denies any urinary symptoms.  In the ED symptoms have slowly 

started to resolve.  Patient reports she has almost back to her baseline.  

Patient underwent head CT as well as CTA head and neck without acute findings.  

Alert was called however TPA was not given due to the patient's history of 

intracranial hemorrhage in the past.  Vital signs remained stable.  Patient was 

given full dose aspirin.





Past Medical/Surgical History


Medical Problems:


(1) Dyslipidemia


Status: Chronic  





(2) Hypertension


Status: Chronic  





(3) ICH (intracerebral hemorrhage)


Status: Resolved  





(4) Mnire's disease


Status: Chronic  





Surgical Problems:


(1) H/O dilation and curettage


Status: Chronic  





(2) H/O tubal ligation


Status: Chronic  





(3) Hx of appendectomy


Status: Chronic  





(4) Hx of tonsillectomy


Status: Chronic  











Family History





CVA


  BROTHER


FH: colon cancer


  FATHER


FH: pancreatic cancer


  MOTHER





Social History


Smoking Status:  Former Smoker


Alcohol Use:  occasionally





Immunizations


History of Influenza Vaccine:  Yes


Influenza Vaccine Date:  Oct 15, 2017


History of Tetanus Vaccine?:  Yes


Tetanus Immunization Date:  Aug 12, 2008


History of Pneumococcal:  Yes (2/10/17)


Pneumococcal Date:  Feb 10, 2017





Allergies


Coded Allergies:  


     Amoxicillin (Verified  Adverse Reaction, Intermediate, nausea, 10/16/15)


     Clavulanic Acid (Verified  Adverse Reaction, Intermediate, nausea, 10/16/15

)


     Prednisone (Verified  Adverse Reaction, Intermediate, nausea, 10/16/15)


     Sulfa Antibiotics (Verified  Adverse Reaction, Intermediate, nausea, 10/16/

15)


     Doxycycline (Unverified  Adverse Reaction, Unknown, nausea, 10/16/15)





Home Medications


Scheduled


Calcium/Vitamin D (Caltrate 600 Plus *), 1 TAB PO DAILY


Enalapril Maleate (Vasotec), 20 MG PO BID


Garlic (Garlic), 1 CAP PO BID


Hydrochlorothiazide (Hydrochlorothiazide), 25 MG PO DAILY


Meclizine HCl (Meclizine HCl), 1 TAB PO QAM


Meclizine Hcl (Meclizine Hcl), 1 TAB PO QPM


Mometasone Furoate (Nasal) (Nasonex), 2 SPRY YUNIEL DAILY


Multiple Vitamin (Multivitamin), 1 TAB PO DAILY


Omega-3 Fatty Acids (Omega-3), 500 MG PO DAILY


Plant Sterols And Stanols (Cholest Off), 2 TAB PO DAILY


Potassium Chloride (Potassium Chloride Er), 10 MEQ PO BID





Review of Systems


ROS per HPI, all other systems reviewed and negative





Physical Exam


Vital Signs











  Date Time  Temp Pulse Resp B/P (MAP) Pulse Ox O2 Delivery O2 Flow Rate FiO2


 


4/13/18 12:30 36.9 80 18 168/98 (121) 97 Room Air  


 


4/13/18 11:56  76 17 141/89 94 Room Air  


 


4/13/18 11:00     96 Room Air  


 


4/13/18 10:56  79 20 161/92 96 Room Air  


 


4/13/18 10:10  87 22 184/96 96 Room Air  


 


4/13/18 10:06  93      


 


4/13/18 09:59  88 18 177/98 95 Room Air  


 


4/13/18 09:53     97 Room Air  


 


4/13/18 09:29 36.6 95 20 156/93 96 Room Air  








General Appearance:  WD/WN, no apparent distress


Head:  normocephalic, atraumatic


Eyes:  normal inspection, PERRL, EOMI, sclerae normal


ENT:  hearing grossly normal, + pertinent finding (Mucous membranes moist)


Neck:  supple, no JVD, no carotid bruits


Respiratory/Chest:  lungs clear, normal breath sounds, no respiratory distress


Cardiovascular:  regular rate, rhythm, no edema, normal peripheral pulses


Abdomen/GI:  normal bowel sounds, non tender, soft, no organomegaly


Extremities/Musculoskelatal:  normal inspection, no calf tenderness, normal 

capillary refill


Neurologic/Psych:  alert, normal mood/affect, oriented x 3, + pertinent finding 

(Right lower extremity strength 4/5; otherwise no focal deficits noted)


Skin:  normal color, warm/dry





Diagnostics


Laboratory Results





Results Past 24 Hours








Test


  4/13/18


09:36 4/13/18


09:38 4/13/18


09:52 4/13/18


09:56 Range/Units


 


 


Bedside Hemoglobin 15.0    12.0-16.0  g/dl


 


Bedside Hematocrit 44    37-47  %


 


Bedside Sodium 139    135-144  mEq/L


 


Bedside Potassium 3.5    3.3-5.0  mEq/L


 


Bedside Chloride 97    101-112  mEq/L


 


Bedside Total CO2 31    24-31  mEq/l


 


Anion Gap 15.0   4.0 3-11  mmol/L


 


Bedside Blood Urea Nitrogen 12    7-18  mg/dl


 


Bedside Creatinine 0.9    0.6-1.3  mg/dl


 


Bedside Glucose (other) 111    70-99  mg/dl


 


Bedside Ionized Calcium (Richard)


  1.20


  


  


  


  1.12-1.32


mmol/l


 


White Blood Count  5.78   4.8-10.8  K/uL


 


Red Blood Count  4.77   4.2-5.4  M/uL


 


Hemoglobin  14.5   12.0-16.0  g/dL


 


Hematocrit  42.0   37-47  %


 


Mean Corpuscular Volume  88.1     fL


 


Mean Corpuscular Hemoglobin  30.4   25-34  pg


 


Mean Corpuscular Hemoglobin


Concent 


  34.5


  


  


  32-36  g/dl


 


 


Platelet Count  231   130-400  K/uL


 


Mean Platelet Volume  11.0   7.4-10.4  fL


 


Neutrophils (%) (Auto)  48.4    %


 


Lymphocytes (%) (Auto)  41.0    %


 


Monocytes (%) (Auto)  7.3    %


 


Eosinophils (%) (Auto)  2.8    %


 


Basophils (%) (Auto)  0.3    %


 


Neutrophils # (Auto)  2.80   1.4-6.5  K/uL


 


Lymphocytes # (Auto)  2.37   1.2-3.4  K/uL


 


Monocytes # (Auto)  0.42   0.11-0.59  K/uL


 


Eosinophils # (Auto)  0.16   0-0.5  K/uL


 


Basophils # (Auto)  0.02   0-0.2  K/uL


 


RDW Standard Deviation  40.8   36.4-46.3  fL


 


RDW Coefficient of Variation  12.7   11.5-14.5  %


 


Immature Granulocyte % (Auto)  0.2    %


 


Immature Granulocyte # (Auto)  0.01   0.00-0.02  K/uL


 


Prothrombin Time


  


  10.7


  


  


  9.0-12.0


SECONDS


 


Prothromb Time International


Ratio 


  1.0


  


  


  0.9-1.1  


 


 


Activated Partial


Thromboplast Time 


  27.8


  


  


  21.0-31.0


SECONDS


 


Partial Thromboplastin Ratio  1.1    


 


Estimated Average Glucose  120    mg/dl


 


Hemoglobin A1c  5.8   4.5-5.6  %


 


Bedside Prothrombin Time INR   1.1  0.9-1.1  


 


Sodium Level    135 136-145  mmol/L


 


Potassium Level    4.2 3.5-5.1  mmol/L


 


Chloride Level    101   mmol/L


 


Carbon Dioxide Level    30 21-32  mmol/L


 


Blood Urea Nitrogen    12 7-18  mg/dl


 


Creatinine


  


  


  


  0.80


  0.60-1.20


mg/dl


 


Est Creatinine Clear Calc


Drug Dose 


  


  


  80.4


   ml/min


 


 


Estimated GFR (


American) 


  


  


  88.4


   


 


 


Estimated GFR (Non-


American 


  


  


  76.3


   


 


 


BUN/Creatinine Ratio    14.5 10-20  


 


Random Glucose    95 70-99  mg/dl


 


Calcium Level    8.9 8.5-10.1  mg/dl


 


Magnesium Level    2.0 1.8-2.4  mg/dl


 


Total Creatine Kinase    104   U/L


 


Creatine Kinase MB    1.8 0.5-3.6  ng/ml


 


Creatine Kinase MB Ratio    1.7 0-3.0  


 


Troponin I    < 0.015 0-0.045  ng/ml











Diagnostic Radiology


HEAD CT IMPRESSION:  No acute intracranial findings. 





CXR IMPRESSION:  No acute cardiopulmonary findings. 





CTA NECK IMPRESSION: Unremarkable CT angiogram of the neck.





CTA HEAD IMPRESSION:  


1. No abrupt vessel cut off identified.


2. Tiny 2 mm aneurysm arising from the supraclinoid right ICA. No additional


intracranial aneurysms.


3. Moderate plaque within the bilateral cavernous carotids without


hemodynamically significant stenosis.





Impression


Assessment and Plan


RIGHT-SIDED WEAKNESS


R/O CVA


-Admitted to telemetry


-Patient presenting with right-sided weakness started around 8:00 this morning, 

symptoms have gradually resolved since arrival in the ER and patient is now 

near baseline


-Head CT, CTA head and neck negative for acute findings in the ED


-Stroke alert called however patient felt not to be a candidate for TPA 

secondary to her history of subarachnoid hemorrhage in 2006 (which was felt to 

be hypertensive in nature)


-S/P full dose aspirin in the ED, will continue with aspirin 81 mg daily


-Will start Lipitor 40 mg daily, patient was recently diagnosed with 

dyslipidemia as an outpatient however was trying diet modifications before 

starting prescription therapy


-Neurochecks


-Brain MRI, echo


-Permissive hypertension


-Neurology consult, case discussed with Mi Nicolas PA-C





HYPERTENSION


-Will hold enalapril and hydrochlorothiazide for now while allowing for 

permissive hypertension due to possible CVA





DYSLIPIDEMIA


-Start on statin therapy





DVT PROPHYLAXIS


-SCDs due to history of intracranial hemorrhage





DISPOSITION


-In my clinical judgment this beneficiary meets acute admission criteria, 

established by CMS, that includes being hospitalized through two midnights.





ADDENDUM:  I have seen and examined the patient above and agree w the 

assessment and plan as stated.  MRI reveals no acute intracranial abnormality 

and at this point she appears to have a total resolution of her symptoms, 

however, gait was not assessed.  CTA revealed tiny 2mm aneurysmal focus that 

was unchanged from a prior study at [place].  TTE was normal without 

interatrial shunting.  Would cont ASA/statin therapy for now as clinical 

picture is consistent with stroke.   Appreciate Neurology recs for definitive 

plan.  Of note, patient does have chronic Meniere's disease and symptoms are 

well-managed with meclizine.  She denies any vertigo with symptoms today.  

Oscar, 





Advanced Directives


Existing Living Will:  Yes


Existing Power of :  Yes





Resuscitation Status








VTE Prophylaxis


Will order VTE Prophylaxis:  Yes

## 2018-04-13 NOTE — DIAGNOSTIC IMAGING REPORT
BRAIN COMBO



CLINICAL HISTORY: 68 years-old Female presenting with Stroke, off balance, right

arm and leg weakness, history of stroke and intracranial hemorrhage at 13 years

of age. 



TECHNIQUE: Multisequence, multiplanar MR imaging of the brain was performed

before and after the administration of intravenous contrast. IV contrast: 8.3 mL

of Gadavist.



COMPARISON: 10/16/2015 and head CT from earlier the same day.



FINDINGS: 

Ventricles and sulci normal in size. Periventricular and subcortical white

matter T2/FLAIR hyperintensity, nonspecific but likely indicative of chronic

small vessel ischemic change. No mass effect or midline shift. No restricted

diffusion to suggest acute ischemia. No hemorrhage. No extra-axial fluid

collection. 



T2 skull base flow voids preserved. No abnormal parenchymal enhancement.



Bone marrow signal intensity within the calvarium within normal limits.



IMPRESSION:  

1.  Chronic small vessel ischemic change. No acute intracranial abnormality. No

abnormal enhancement. 







Electronically signed by:  Maciel Chilel M.D.

4/13/2018 6:49 PM



Dictated Date/Time:  4/13/2018 6:44 PM

## 2018-04-14 VITALS
DIASTOLIC BLOOD PRESSURE: 92 MMHG | TEMPERATURE: 98.78 F | HEART RATE: 81 BPM | SYSTOLIC BLOOD PRESSURE: 138 MMHG | OXYGEN SATURATION: 96 %

## 2018-04-14 VITALS
OXYGEN SATURATION: 95 % | SYSTOLIC BLOOD PRESSURE: 110 MMHG | TEMPERATURE: 98.06 F | HEART RATE: 81 BPM | DIASTOLIC BLOOD PRESSURE: 65 MMHG

## 2018-04-14 VITALS
OXYGEN SATURATION: 92 % | SYSTOLIC BLOOD PRESSURE: 124 MMHG | DIASTOLIC BLOOD PRESSURE: 81 MMHG | TEMPERATURE: 98.6 F | HEART RATE: 88 BPM

## 2018-04-14 VITALS
TEMPERATURE: 97.7 F | OXYGEN SATURATION: 94 % | SYSTOLIC BLOOD PRESSURE: 126 MMHG | HEART RATE: 75 BPM | DIASTOLIC BLOOD PRESSURE: 78 MMHG

## 2018-04-14 LAB
BASOPHILS # BLD: 0.02 K/UL (ref 0–0.2)
BASOPHILS NFR BLD: 0.3 %
BUN SERPL-MCNC: 12 MG/DL (ref 7–18)
CALCIUM SERPL-MCNC: 9.4 MG/DL (ref 8.5–10.1)
CO2 SERPL-SCNC: 29 MMOL/L (ref 21–32)
CREAT SERPL-MCNC: 0.75 MG/DL (ref 0.6–1.2)
EOS ABS #: 0.25 K/UL (ref 0–0.5)
EOSINOPHIL NFR BLD AUTO: 255 K/UL (ref 130–400)
GLUCOSE SERPL-MCNC: 103 MG/DL (ref 70–99)
HCT VFR BLD CALC: 44.1 % (ref 37–47)
HGB BLD-MCNC: 15.1 G/DL (ref 12–16)
IG#: 0.01 K/UL (ref 0–0.02)
IMM GRANULOCYTES NFR BLD AUTO: 51.4 %
KETONES UR QL STRIP: 144 MG/DL
LYMPHOCYTES # BLD: 3.01 K/UL (ref 1.2–3.4)
MCH RBC QN AUTO: 30 PG (ref 25–34)
MCHC RBC AUTO-ENTMCNC: 34.2 G/DL (ref 32–36)
MCV RBC AUTO: 87.7 FL (ref 80–100)
MONO ABS #: 0.49 K/UL (ref 0.11–0.59)
MONOCYTES NFR BLD: 8.4 %
NEUT ABS #: 2.08 K/UL (ref 1.4–6.5)
NEUTROPHILS # BLD AUTO: 4.3 %
NEUTROPHILS NFR BLD AUTO: 35.4 %
PH UR: 228 MG/DL (ref 0–200)
PMV BLD AUTO: 11.1 FL (ref 7.4–10.4)
POTASSIUM SERPL-SCNC: 3.6 MMOL/L (ref 3.5–5.1)
RED CELL DISTRIBUTION WIDTH CV: 12.8 % (ref 11.5–14.5)
RED CELL DISTRIBUTION WIDTH SD: 41.1 FL (ref 36.4–46.3)
SODIUM SERPL-SCNC: 138 MMOL/L (ref 136–145)
WBC # BLD AUTO: 5.86 K/UL (ref 4.8–10.8)

## 2018-04-14 RX ADMIN — ATORVASTATIN CALCIUM SCH MG: 40 TABLET, FILM COATED ORAL at 08:01

## 2018-04-14 RX ADMIN — POTASSIUM CHLORIDE SCH MEQ: 750 TABLET, EXTENDED RELEASE ORAL at 08:01

## 2018-04-14 NOTE — PROGRESS NOTE
DATE: 04/14/2018

 

SUBJECTIVE:  I am seeing Mrs. Fernández in followup of a history of what I

assume is a hypertensive hemorrhage in the left thalamus with symptoms in the

right body, right arm and leg weakness which for the most part was transient,

albeit that the patient thinks she has some residual.  Her MRI of the brain

was unremarkable.  No high grade vascular stenosis and no cardiovascular

source.  She has been started on aspirin and statin.  She has not had any

recurrent events.  Exam reveals her to have a mild right drift and to be a

cautious ambulator which is apparently not her baseline.

 

IMPRESSION:  Radiographically, this was a transient event.  It is possible

she had a very small vessel infarction causing her to have some residual

neurologic deficit.

 

PLAN:  Aspirin, statin therapy, outpatient physical therapy.  We will see the

patient in followup.  We may consider a repeat MRI of the brain if the

neurologic symptoms persist and we will arrange Zio patch for ongoing cardiac

monitoring as an outpatient.

 

 

 

 

ROGER

## 2018-04-14 NOTE — PHARMACY PROGRESS NOTE
Pharmacist Stroke Counseling


Date of Service


Apr 14, 2018.





Scope


Pharmacy has been consulted to provide medication discharge counseling for this 

patient admitted with ischemic stroke/hemorrhagic stroke/ transient ischemic 

attack as per the Pharmacist Discharge Counseling for Stroke Patients Protocol.





Medications on Discharge


New Medications:  


Aspirin (Aspirin EC Low Dose) 81 Mg Ectab


81 MG PO QAM for 30 Days





Atorvastatin (Lipitor) 40 Mg Tab


40 MG PO QAM for 30 Days, #30 TAB





 


Continued Medications:  


Calcium/Vitamin D (Caltrate 600 Plus *)  Tab


1 TAB PO DAILY





Enalapril Maleate (Vasotec) 20 Mg Tab


20 MG PO BID, TAB 3 Refills





Garlic (Garlic) 10 Mg Cap


1 CAP PO BID





Hydrochlorothiazide (Hydrochlorothiazide) 25 Mg Tab


25 MG PO DAILY





Meclizine Hcl (Meclizine Hcl) 25 Mg Tab


1 TAB PO QPM for 10 Days, TAB





Meclizine HCl (Meclizine HCl) 12.5 Mg Tab


1 TAB PO QAM for 30 Days, #30 TAB 3 Refills





Mometasone Furoate (Nasal) (Nasonex) 50 Mcg/  Spr


2 SPRY YUNIEL DAILY, INHALER 5 Refills





Multiple Vitamin (Multivitamin) 1 Tab Tab


1 TAB PO DAILY, TAB





Omega-3 Fatty Acids (Omega-3) 1 Cap Cap


500 MG PO DAILY





Plant Sterols And Stanols (Cholest Off) 450 Mg Tab


2 TAB PO DAILY





Potassium Chloride (Potassium Chloride Er) 10 Meq Cap


10 MEQ PO BID, CAP 3 Refills








Provider's discharge instructions state pt is not to resume enalapril or 

hydrochlorothiazide unless BP above 140/90.





I advised pt she should hold the Cholest Off supplement on discharge as she 

started this on her own and she is now starting Lipitor





Action


The above medications, specifically ones for stroke treatment/prophylaxis, have 

been reviewed in detail with the patient and/or patient representative(s) prior 

to discharge.  This includes indication, common adverse reactions, drug 

interactions, and medication administration.  Medication counseling has been 

employed using the teach-back method to ensure understanding.





Outcome


The patient and/or patient representative(s) have demonstrated understanding of 

the medications.





Please note, they are aware that the pharmacist will call them within 72 hours 

post-discharge to confirm that the appropriate medications are being taken and 

answer any further medication related questions the patient might have at that 

time.  





Contact information


Individual to be contacted: Patient


Phone number: [phone]


Best time to call: anytime





Additional comments:





Patient was given written Rx's for Aspirin 81mg daily and Lipitor 40mg PO daily 

with discharge paperwork.


Patient still has some residual R sided weakness but has improved since 

admission.


She states she has a h/o hemorrhagic stroke ~12 years ago which produced 

similar stroke symptoms.


Patient's provider (Dr Godinez) will be contacting the patient on Monday to 

schedule f/u appointment.





Thank you for allowing pharmacy to be involved in the care of this patient.  

Please call [phone] or [phone] with any additional questions

## 2018-04-14 NOTE — PROGRESS NOTE
Medicine Progress Note


Date & Time of Visit:


Apr 14, 2018 at 09:51.


Subjective


Pt was seen and examined


Sitting at the edge of the bed with no distress


Pt walked to the bathroom this morning with assistance


She said that she still have weakness in the right side


Pt said that she does not have any weakness and slurred speech


Denies any chest pain, palpitation, dizziness and SOB





Objective





Last 8 Hrs








  Date Time  Temp Pulse Resp B/P (MAP) Pulse Ox O2 Delivery O2 Flow Rate FiO2


 


4/14/18 08:00      Room Air  


 


4/14/18 07:10 36.5 75 20 126/78 (94) 94 Room Air  


 


4/14/18 04:00      Room Air  


 


4/14/18 03:00 36.7 81 17 110/65 (80) 95 Room Air  








Physical Exam:


General- No acute distress


Head-  atraumatic


Eyes- PERRL, EOMI


ENT- oropharynx clear


Neck- supple, no JVD


Lungs- clear to auscultation 


Heart- regular rhythm


Abdomen- normal bowel sounds, soft


Extremities- no calf tenderness


Neuro- alert, oriented x 3; PERRL, EOMI; no facial palsy, strength 4/5 RUE, 

decrease hand 


Skin- warm & dry


Laboratory Results:





Last 24 Hours








Test


  4/13/18


09:52 4/13/18


09:56 4/14/18


06:06


 


Bedside Prothrombin Time INR 1.1   


 


Sodium Level  135 mmol/L  138 mmol/L 


 


Potassium Level  4.2 mmol/L  3.6 mmol/L 


 


Chloride Level  101 mmol/L  103 mmol/L 


 


Carbon Dioxide Level  30 mmol/L  29 mmol/L 


 


Anion Gap  4.0 mmol/L  6.0 mmol/L 


 


Blood Urea Nitrogen  12 mg/dl  12 mg/dl 


 


Creatinine  0.80 mg/dl  0.75 mg/dl 


 


Est Creatinine Clear Calc


Drug Dose 


  80.4 ml/min 


  78.7 ml/min 


 


 


Estimated GFR (


American) 


  88.4 


  95.6 


 


 


Estimated GFR (Non-


American 


  76.3 


  82.5 


 


 


BUN/Creatinine Ratio  14.5  16.1 


 


Random Glucose  95 mg/dl  103 mg/dl 


 


Calcium Level  8.9 mg/dl  9.4 mg/dl 


 


Magnesium Level  2.0 mg/dl  


 


Total Creatine Kinase  104 U/L  


 


Creatine Kinase MB  1.8 ng/ml  


 


Creatine Kinase MB Ratio  1.7  


 


Troponin I  < 0.015 ng/ml  


 


White Blood Count   5.86 K/uL 


 


Red Blood Count   5.03 M/uL 


 


Hemoglobin   15.1 g/dL 


 


Hematocrit   44.1 % 


 


Mean Corpuscular Volume   87.7 fL 


 


Mean Corpuscular Hemoglobin   30.0 pg 


 


Mean Corpuscular Hemoglobin


Concent 


  


  34.2 g/dl 


 


 


Platelet Count   255 K/uL 


 


Mean Platelet Volume   11.1 fL 


 


Neutrophils (%) (Auto)   35.4 % 


 


Lymphocytes (%) (Auto)   51.4 % 


 


Monocytes (%) (Auto)   8.4 % 


 


Eosinophils (%) (Auto)   4.3 % 


 


Basophils (%) (Auto)   0.3 % 


 


Neutrophils # (Auto)   2.08 K/uL 


 


Lymphocytes # (Auto)   3.01 K/uL 


 


Monocytes # (Auto)   0.49 K/uL 


 


Eosinophils # (Auto)   0.25 K/uL 


 


Basophils # (Auto)   0.02 K/uL 


 


RDW Standard Deviation   41.1 fL 


 


RDW Coefficient of Variation   12.8 % 


 


Immature Granulocyte % (Auto)   0.2 % 


 


Immature Granulocyte # (Auto)   0.01 K/uL 


 


Triglycerides Level   118 mg/dl 


 


Cholesterol Level   228 mg/dl 


 


HDL Cholesterol   60 mg/dl 


 


LDL Cholesterol, Calculated   144 mg/dl 


 


VLDL Cholesterol, Calculated   24 mg/dl 


 


Cholesterol/HDL Ratio   3.8 











Assessment & Plan


RIGHT-SIDED WEAKNESS


R/O CVA


Possible related to TIA


Stroke alert called, but not a candidate for TPA due to history of subarachnoid 

hemorrhage in 2006


Head CT negative for acute findings 


CTA neck was negative


CTA head showed tiny 2 mm aneurysm arising from the supraclinoid right ICA. No 

additional intracranial aneurysms.


MRI head showed no acute intracranial abnormality


Continue statin and aspirin


No arrhythmia on tele monitor


PT/OT


Neuro on board


Fall precaution


ECHO 


* Injection of contrast documented no interatrial shunt.


* The interatrial septum is intact with no evidence for an atrial septal defect.


* The left ventricle is normal in size.


* Left ventricular systolic function is normal.


* Ejection Fraction = 55-60%.


* The right ventricular systolic function is normal.


* The left atrial size is normal.


* Right atrial size is normal.


* No significant valvular pathology.








HYPERTENSION


BP stable


Continue holding enalapril and HCTZ








DYSLIPIDEMIA


Chol 228, , HDL 60, trig 168


Continue Lipitor





DVT PROPHYLAXIS


-SCDs due to history of intracranial hemorrhage





DISPOSITION


Will discharge once medically stable


Current Inpatient Medications:





Current Inpatient Medications








 Medications


  (Trade)  Dose


 Ordered  Sig/Soha


 Route  Start Time


 Stop Time Status Last Admin


Dose Admin


 


 Ioversol


  (Optiray 300)  121 ml  UD  PRN


 IV  4/13/18 10:00


 4/17/18 09:59   


 


 


 Acetaminophen


  (Tylenol Tab)  650 mg  Q4H  PRN


 PO  4/13/18 11:45


 5/13/18 11:44   


 


 


 Ondansetron HCl


  (Zofran Inj)  4 mg  Q6H  PRN


 IV  4/13/18 11:45


 5/13/18 11:44   


 


 


 Aspirin


  (Ecotrin Tab)  81 mg  QAM


 PO  4/14/18 09:00


 5/14/18 08:59  4/14/18 08:02


81 MG


 


 Atorvastatin


 Calcium


  (Lipitor Tab)  40 mg  QAM


 PO  4/13/18 12:00


 5/13/18 11:59  4/14/18 08:01


40 MG


 


 Miscellaneous


 Information


  (Pharmacist


 Discharge Med Rec


 Consult)  1 ea  UD  PRN


 N/A  4/13/18 12:00


 5/13/18 11:59   


 


 


 Calcium/Vitamin D


  (Caltrate Plus


 Tab)  1 tab  DAILY


 PO  4/14/18 09:00


 5/14/18 08:59  4/14/18 08:03


1 TAB


 


 Multivitamins


  (Multivitamin


 Tab)  1 tab  DAILY


 PO  4/14/18 09:00


 5/14/18 08:59  4/14/18 08:01


1 TAB


 


 Meclizine HCl


  (Antivert Tab)  12.5 mg  DAILY


 PO  4/14/18 09:00


 5/14/18 08:59  4/14/18 08:02


12.5 MG


 


 Fish Oil


  (Omega-3


  (Purified Fish


 Oil) Cap)  1 gm  DAILY


 PO  4/14/18 09:00


 5/14/18 08:59  4/14/18 08:03


1 GM


 


 Potassium Chloride


  (Klor-Con M10)  10 meq  BID


 PO  4/13/18 21:00


 5/13/18 20:59  4/14/18 08:01


10 MEQ


 


 Alprazolam


  (Xanax Tab)  0.5 mg  ONE  PRN


 PO  4/13/18 15:00


 5/13/18 14:59  4/13/18 17:10


0.5 MG


 


 Meclizine HCl


  (Antivert Tab)  25 mg  HS


 PO  4/13/18 21:00


 5/13/18 16:14  4/13/18 20:25


25 MG

## 2018-04-14 NOTE — DISCHARGE INSTRUCTIONS
Discharge Instructions


Date of Service


Apr 14, 2018.





Admission


Reason for Admission:  Right Sided Weakness





Discharge


Discharge Diagnosis / Problem:  Right sided weakness, Dyslipidemia





Discharge Goals


Goal(s):  Decrease discomfort, Improve function, Improve disease control





Activity Recommendations


Activity Limitations:  resume your previous activity (as tolerated)





.





Instructions / Follow-Up


Instructions / Follow-Up


Follow up with your primary care provider within 1 week (Dr. Godinez's office 

will contact you on Monday for the follow up appointment)


Follow up with Neurology Dr. Saldana between 3 to 4 weeks (Dr. Saldana's 

office will contact you on Monday for the follow up appointment)


Continue physical therapy (Script given)


Fall precaution


Hold Enalapril and Hydrochlorothiazide for now. 


Continue monitor blood pressure


If Blood pressure starts to rise close to 140/90, please resume blood pressure 

medication (Enalapril and Hydrochlorothiazide)


Starting on aspirin 81mg daily. (Please notify your physician if you develop 

any abnormal bleeding such as blood in your stool and your urine)


Follow a low cholesterol diet.


Starting on Lipitor 40mg daily (your physician will check your liver enzymes in 

1 -2 weeks after starting on Lipitor.











Risk Factors for Stroke:





You can reduce your chances of stroke by working with your medical provider to 

adopt a healthy lifestyle.  Some specific ways to lower your chance of stroke 

are:





* If you are a smoker, now is the time to stop smoking cigarettes


* If you are diabetic, improve the control of your blood sugars


* Avoid excessive amounts of alcohol


* Control high blood pressure


* Lose weight if you are overweight


* Be sure to lead an active lifestyle


* Eat a healthy diet low in salt, cholesterol and fat





You should know about other risk factors for stroke that you are unable to 

control.  These include:





* Age 55 years or older


* Male gender


* Certain racial groups: ,  or /


* Family History of Stroke, Mini stroke or Heart Attack


* Sickle Cell Disease





Follow Up:





It is important for you to keep your follow up appointments with your medical 

provider.





Current Hospital Diet


Patient's current hospital diet: AHA Diet (Heart Healthy)





Discharge Diet


Recommended Diet:  AHA Diet (Heart Healthy)





Pending Studies


Studies pending at discharge:  no





Laboratory Results





Hemoglobin A1c








Test


  4/13/18


09:38 Range/Units


 


 


Estimated Average Glucose 120   mg/dl


 


Hemoglobin A1c 5.8 H 4.5-5.6  %








Lipid Panel








Test


  4/14/18


06:06 Range/Units


 


 


Triglycerides Level 118  0-150  mg/dl


 


Cholesterol Level 228 H 0-200  mg/dl


 


HDL Cholesterol 60   mg/dl


 


Cholesterol/HDL Ratio 3.8   


 


LDL Cholesterol, Calculated 144   mg/dl











Medical Emergencies








.


Who to Call and When:





Medical Emergencies:  If at any time you feel your situation is an emergency, 

please call 911 immediately.





.





Non-Emergent Contact


Non-Emergency issues call your:  Primary Care Provider





.


.








"Provider Documentation" section prepared by Terry Holland.








.

## 2018-04-16 NOTE — PHARMACY PROGRESS NOTE
Pharmacist Post D/C Phone Note





Date of phone call:  Apr 16, 2018.














 Medications  Dose


 Route/Sig


 Max Daily Dose Days Date Category


 


 Aspirin EC Low


 Dose (Aspirin) 81


 Mg Ectab  81 Mg


 PO QAM


   30 4/14/18 Rx


 


 Lipitor


  (Atorvastatin


 Calcium) 40 Mg Tab  40 Mg


 PO QAM


   30 4/14/18 Rx


 


 Cholest Off


  (Plant Sterols


 And Stanols) 450


 Mg Tab  2 Tab


 PO DAILY


    4/13/18 Reported


 


 Garlic 10 Mg Cap  1 Cap


 PO BID


    4/13/18 Reported


 


 Omega-3 (Omega-3


 Fatty Acids) 1


 Cap Cap  500 Mg


 PO DAILY


    4/13/18 Reported


 


 Meclizine HCl


 12.5 Mg Tab  1 Tab


 PO QAM


   30 4/13/18 Reported


 


 Meclizine Hcl 25


 Mg Tab  1 Tab


 PO QPM


   10 4/13/18 Reported


 


 Vasotec


  (Enalapril


 Maleate) 20 Mg Tab  20 Mg


 PO BID


    10/16/15 Reported


 


 Multivitamin


  (Multiple


 Vitamin) 1 Tab Tab  1 Tab


 PO DAILY


    10/16/15 Reported


 


 Nasonex


  (Mometasone


 Furoate (Nasal))


 50 Mcg/  Spr  2 [place]


 YUNIEL DAILY


    10/16/15 Reported


 


 Potassium


 Chloride Er


  (Potassium


 Chloride) 10 Meq


 Cap  10 Meq


 PO BID


    10/16/15 Reported


 


 Hydrochlorothiazide


 25 Mg Tab  25 Mg


 PO DAILY


    10/16/15 Reported


 


 Caltrate 600 Plus


 *


  (Calcium/Vitamin


 D)  Tab  1 Tab


 PO DAILY


    11/29/06 Reported











Individual with whom pharmacist spoke to:   [Patient] 








The following questions were reviewed during the phone call with responses 

listed below each:








Can you tell me the medications that you are currently taking as well as when 

and how you take each medication?


   - Patient had her list of medications in front of her and was able to tell 

me how she takes each of them. Currently, the enalpril and hydrochlorothiazide 

are on hold due to blood pressure concerns. She has been taking her blood 

pressure at home frequently and goes tomorrow to see her provider to determine 

if these agents are to be restarted.





When have you missed any doses of your medications?


   - she just started taking her lipitor today, told me she was not able to 

make it to the pharmacy yesterday





What side effects are you having from your medications?


   - not complaining of any side effects





What questions do you have about your medications?


   - no questions during interview, seems pretty knowledgeable about medications





What problems are you having obtaining your medications?


   - n/a - all of them are affordable





When is your next appointment with your primary care doctor?


   - will see PCP tomorrow - talked about bringing updated medication list with 

her








Additional comments:


   - Patient seems to be doing well since being discharged. States she is 

walking with a cane and will be undergoing physical therapy starting wednesday. 

Seems very knowledgeable about medications and knew exactly what she is taking. 

Interview went very well, no additional questions from patient.








As per the Pharmacist Discharge Counseling for Stroke Patients Protocol, this 

phone call has been completed within 72 hours of discharge.





Thank you for allowing us to be involved in the care of this patient.

## 2018-04-16 NOTE — DISCHARGE SUMMARY
Discharge Summary


Date of Service


2018.





Discharge Summary


Admission Date:


2018 at 11:45


Discharge Date:  2018


Discharge Disposition:  Home with services


Principal Diagnosis:


RIGHT-SIDED WEAKNESS


Secondary Diagnoses/Problems:


HTN


DYSLIPIDEMIA


Procedures:


BRAIN COMBO





CLINICAL HISTORY: 68 years-old Female presenting with Stroke, off balance, right


arm and leg weakness, history of stroke and intracranial hemorrhage at 13 years


of age. 





TECHNIQUE: Multisequence, multiplanar MR imaging of the brain was performed


before and after the administration of intravenous contrast. IV contrast: 8.3 mL


of Gadavist.





COMPARISON: 10/16/2015 and head CT from earlier the same day.





FINDINGS: 


Ventricles and sulci normal in size. Periventricular and subcortical white


matter T2/FLAIR hyperintensity, nonspecific but likely indicative of chronic


small vessel ischemic change. No mass effect or midline shift. No restricted


diffusion to suggest acute ischemia. No hemorrhage. No extra-axial fluid


collection. 





T2 skull base flow voids preserved. No abnormal parenchymal enhancement.





Bone marrow signal intensity within the calvarium within normal limits.





IMPRESSION:  


1.  Chronic small vessel ischemic change. No acute intracranial abnormality. No


abnormal enhancement. 











Electronically signed by:  Maciel Chilel M.D.


2018 6:49 PM





Dictated Date/Time:  2018 6:44 PM











CTA ANGIOGRAPHY OF THE HEAD





CLINICAL HISTORY: Stroke symptoms.    





COMPARISON STUDY:  MRA of the head 2015.





TECHNIQUE:  Helical axial images of the head were obtained following uneventful


intravenous administration of 121 cc of Optiray 320.  A dose lowering technique


was utilized adhering to the principles of ALARA.





FINDINGS: The CTA of the neck will be reported separately. The bilateral M1, M2,


A1 and A2 segments are patent. There is moderate plaque within bilateral


cavernous carotids without significant stenosis. No abrupt vessel cut off is


identified. Note is made of a tiny 2 mm aneurysm arising from the undersurface


of the supraclinoid right ICA. No additional intracranial aneurysms are


identified. The right vertebral artery is dominant. The left vertebral artery is


diminutive. This is unchanged. A large left posterior communicating artery is


noted. No acute intracranial hemorrhage, midline shift or mass effect is


present. Ventricular system is normal. Basilar cisterns are patent. No


extra-axial collections are present.











IMPRESSION:  





1. No abrupt vessel cut off identified.





2. Tiny 2 mm aneurysm arising from the supraclinoid right ICA. No additional


intracranial aneurysms.





3. Moderate plaque within the bilateral cavernous carotids without


hemodynamically significant stenosis.











Electronically signed by:  Tacho Michael M.D.


2018 10:04 AM





Dictated Date/Time:  2018 9:55 AM














CT ANGIOGRAM OF THE NECK





CLINICAL HISTORY: Strokelike symptoms.





COMPARISON STUDY:  No priors.





TECHNIQUE: Following the IV administration of 121 of Optiray 320, CT angiogram


of the neck was performed from the aortic arch to the skull base. Images are


reviewed in the axial, sagittal, and coronal planes. 3-D MIPS images are created


and assessed. IV contrast was administered without complication. All


measurements were calculated based on NASCET criteria.  A dose lowering


technique was utilized adhering to the principles of ALARA.





CT DOSE: 527.68 mGy.cm





FINDINGS:





Thoracic aorta: There is moderate carotid calcification of the thoracic aorta.


Visualized portions of the thoracic aorta are normal in caliber. The aortic arch


demonstrates standard 3-vessel anatomy.





Right carotid arterial system: The right common carotid artery is widely patent,


as are the right internal and external carotid arteries. Minimal ulcerated


plaque is noted in the carotid bulb.





Left carotid arterial system: The left common carotid artery is widely patent,


as are the left internal and external carotid arteries.





Vertebral arteries: The vertebral arteries are widely patent bilaterally, noting


right-sided dominance.





Intracranial vasculature: Intracranial vessels are patent at the skull base. The


left vertebral artery is diminutive.





Subclavian arteries: Widely patent bilaterally.





Jugular veins: Widely patent bilaterally.





Brain parenchyma: The visualized brain parenchyma the skull base is within


normal limits.





Lung apices: Partially visualized upper lobe lung parenchyma appears clear.





Soft tissues: The visualized pharyngeal soft tissues are normal in appearance


noting angiographic phase technique. The oropharyngeal airway appears widely


patent. The salivary glands are normal in appearance. There is a 5 mm


low-attenuation nodule in the right thyroid lobe. No cervical lymphadenopathy is


seen.





Skeletal structures: The visualized calvarium at the skull base appears intact.


The imaged cervical spine is within normal limits.








IMPRESSION: Unremarkable CT angiogram of the neck.











Electronically signed by:  [place] Brunson M.D.


2018 10:06 AM





Dictated Date/Time:  2018 9:56 AM











CHEST ONE VIEW PORTABLE





CLINICAL HISTORY: Stroke    





COMPARISON STUDY:  Chest radiograph 2015.





FINDINGS: Dextroscoliosis of the upper thoracic spine is incidentally noted. No


pneumothorax or pleural effusion is noted. There is no evidence for pulmonary


edema. Cardiomediastinal silhouette is unremarkable. Appearance of the chest is


unchanged. 





IMPRESSION:  No acute cardiopulmonary findings. 














Electronically signed by:  Tacho Michael M.D.


2018 10:07 AM





Dictated Date/Time:  2018 10:07 AM














CT OF THE HEAD WITHOUT CONTRAST





CLINICAL HISTORY: Stroke    





COMPARISON STUDY:  Head CT Suad 15, 2015 and MRI of the brain 2015. 





CT DOSE: 786.26 mGy.cm





TECHNIQUE: Helical axial images of the head were obtained without IV contrast.


Automated exposure control was utilized for the study.  A dose lowering


technique was utilized adhering to the principles of ALARA.








FINDINGS: No acute intracranial hemorrhage, midline shift or mass effect is


present. Ventricular system is normal. Basilar cisterns are patent. An old left


thalamic infarct is noted. There is an old infarct within the left centrum


semiovale. White matter hypodensities are unchanged. There are no findings to


suggest acute dural sinus thrombosis or acute territorial infarct. There are no


significant calvarial abnormalities. Visualized portions of the sinuses and


mastoid air cells are clear.





IMPRESSION:  No acute intracranial findings. 











Electronically signed by:  Tacho Michael M.D.


2018 9:52 AM





Dictated Date/Time:  2018 9:43 AM








ECHO


Interpretation Summary


* Name: JENAE BARBER  Study Date: 2018 01:11 PM  BP: 168/98 mmHg


* MRN: [id number]  Patient Location: [facility]  HR: 80


* : 1950 (M/d/yyyy)  Gender: Female  Height: 70 in


* Age: 68 yrs  Ethnicity: [place]  Weight: 184 lb


* Ordering Physician: Albina Peterson


* Performed By: Gabrielle Aly RDCS


* Accession# [id number]  Account# [id number]


* Reason For Study: Cerebral Vascular Attack


* BSA: 2.0 m2


* -- Conclusions --


* Injection of contrast documented no interatrial shunt.


* The interatrial septum is intact with no evidence for an atrial septal defect.


* The left ventricle is normal in size.


* Left ventricular systolic function is normal.


* Ejection Fraction = 55-60%.


* The right ventricular systolic function is normal.


* The left atrial size is normal.


* Right atrial size is normal.


* No significant valvular pathology.


Procedure Details


* A complete two-dimensional transthoracic echocardiogram was performed (2D, M-

mode, Doppler and color flow Doppler).


* A saline contrast injection was performed to assess for cardiac shunting.


* The injection was performed through an intravenous line in the left arm.


* The attending nurse who injected the saline contrast was Leann De Guzman RN.


* A total of 20 cc of agitated saline was given.


Left Ventricle


* The left ventricle is normal in size.


* There is normal left ventricular wall thickness.


* Ejection Fraction = 55-60%.


* Left ventricular systolic function is normal.


Right Ventricle


* The right ventricle is normal size.


* The right ventricular systolic function is normal.


Atria


* The left atrial size is normal.


* Right atrial size is normal.


* Injection of contrast documented no interatrial shunt.


* The interatrial septum is intact with no evidence for an atrial septal defect.


Mitral Valve


* The mitral valve anatomy is normal.


* Significant mitral regurgitation is absent.


Tricuspid Valve


* The tricuspid valve anatomy is normal.


* Significant tricuspid regurgitation is absent.


Aortic Valve


* The aortic valve is tricuspid.  The leaflet thickness if normal. There is no 

aortic stenosis, and no significant insufficiency.


Pulmonic Valve


* The pulmonic valve is not well visualized.


Great Vessels


* The aortic root and proximal ascending aorta are normal sized.


Pericardium/Pleural


* There is no pericardial effusion.


Consultations:


NEURO





Medication Reconciliation


New Medications:  


Aspirin (Aspirin EC Low Dose) 81 Mg Ectab


81 MG PO QAM for 30 Days





Atorvastatin (Lipitor) 40 Mg Tab


40 MG PO QAM for 30 Days, #30 TAB





 


Continued Medications:  


Calcium/Vitamin D (Caltrate 600 Plus *)  Tab


1 TAB PO DAILY





Enalapril Maleate (Vasotec) 20 Mg Tab


20 MG PO BID, TAB 3 Refills





Garlic (Garlic) 10 Mg Cap


1 CAP PO BID





Hydrochlorothiazide (Hydrochlorothiazide) 25 Mg Tab


25 MG PO DAILY





Meclizine Hcl (Meclizine Hcl) 25 Mg Tab


1 TAB PO QPM for 10 Days, TAB





Meclizine HCl (Meclizine HCl) 12.5 Mg Tab


1 TAB PO QAM for 30 Days, #30 TAB 3 Refills





Mometasone Furoate (Nasal) (Nasonex) 50 Mcg/  Spr


2 SPRY YUNIEL DAILY, INHALER 5 Refills





Multiple Vitamin (Multivitamin) 1 Tab Tab


1 TAB PO DAILY, TAB





Omega-3 Fatty Acids (Omega-3) 1 Cap Cap


500 MG PO DAILY





Plant Sterols And Stanols (Cholest Off) 450 Mg Tab


2 TAB PO DAILY





Potassium Chloride (Potassium Chloride Er) 10 Meq Cap


10 MEQ PO BID, CAP 3 Refills











Admission Information


HPI (per Admitting provider):


68-year-old female who presents to the ER with chief complaint of right-sided 

weakness.  Patient reports she woke up feeling in her usual state of health.  

She was walking through a store when she reports her right leg started to not 

feel right.  She felt as though maybe the floor was slippery.  She then went 

home and noticed that her right arm started to become weak.  She had trouble 

holding her coffee cup with her right hand.  She also reports some difficulty 

walking noting that her right leg was weak and that she was falling to the 

right side.  Patient denies slurred speech, facial droop, or dysphasia.  No 

headache or blurred vision.  She denies chest pain and shortness of breath.  No 

lightheadedness, dizziness, diaphoresis, or syncopal events.  She denies 

abdominal pain, nausea, vomiting, diarrhea.  No other recent illnesses, fevers, 

or chills.  She denies any urinary symptoms.  In the ED symptoms have slowly 

started to resolve.  Patient reports she has almost back to her baseline.  

Patient underwent head CT as well as CTA head and neck without acute findings.  

Alert was called however TPA was not given due to the patient's history of 

intracranial hemorrhage in the past.  Vital signs remained stable.  Patient was 

given full dose aspirin.


Physical Exam (per Admitting):  


   General Appearance:  WD/WN, no apparent distress


   Head:  normocephalic, atraumatic


   Eyes:  normal inspection, PERRL, EOMI, sclerae normal


   ENT:  hearing grossly normal, + pertinent finding (Mucous membranes moist)


   Neck:  supple, no JVD, no carotid bruits


   Respiratory/Chest:  lungs clear, normal breath sounds, no respiratory 

distress


   Cardiovascular:  regular rate, rhythm, no edema, normal peripheral pulses


   Abdomen/GI:  normal bowel sounds, non tender, soft, no organomegaly


   Extremities/Musculoskelatal:  normal inspection, no calf tenderness, normal 

capillary refill


   Neurologic/Psych:  alert, normal mood/affect, oriented x 3, + pertinent 

finding (Right lower extremity strength 4/5; otherwise no focal deficits noted)


   Skin:  normal color, warm/dry





Hospital Course


RIGHT-SIDED WEAKNESS


R/O CVA


Possible related to TIA


Stroke alert called, but not a candidate for TPA due to history of subarachnoid 

hemorrhage in 


Head CT negative for acute findings 


CTA neck was negative


CTA head showed tiny 2 mm aneurysm arising from the supraclinoid right ICA. No 

additional intracranial aneurysms.


MRI head showed no acute intracranial abnormality


Continue statin and aspirin


No arrhythmia on tele monitor


PT/OT


Neuro on board


Fall precaution


ECHO 


* Injection of contrast documented no interatrial shunt.


* The interatrial septum is intact with no evidence for an atrial septal defect.


* The left ventricle is normal in size.


* Left ventricular systolic function is normal.


* Ejection Fraction = 55-60%.


* The right ventricular systolic function is normal.


* The left atrial size is normal.


* Right atrial size is normal.


* No significant valvular pathology.








HYPERTENSION


BP stable


Continue holding enalapril and HCTZ








DYSLIPIDEMIA


Chol 228, , HDL 60, trig 168


Continue Lipitor





DVT PROPHYLAXIS


-SCDs due to history of intracranial hemorrhage





DISPOSITION


Will discharge once medically stable


Total time spent on discharge = 35 MINUTES


This includes examination of the patient, discharge planning, medication 

reconciliation, and communication with other providers.





Discharge Instructions


Discharge Instructions


Date of Service


2018.





Admission


Reason for Admission:  Right Sided Weakness





Discharge


Discharge Diagnosis / Problem:  Right sided weakness, Dyslipidemia





Discharge Goals


Goal(s):  Decrease discomfort, Improve function, Improve disease control





Activity Recommendations


Activity Limitations:  resume your previous activity (as tolerated)





.





Instructions / Follow-Up


Instructions / Follow-Up


Follow up with your primary care provider within 1 week (Dr. Godinez's office 

will contact you on Monday for the follow up appointment)


Follow up with Neurology Dr. Saldana between 3 to 4 weeks (Dr. Saldana's 

office will contact you on Monday for the follow up appointment)


Continue physical therapy (Script given)


Fall precaution


Hold Enalapril and Hydrochlorothiazide for now. 


Continue monitor blood pressure


If Blood pressure starts to rise close to 140/90, please resume blood pressure 

medication (Enalapril and Hydrochlorothiazide)


Starting on aspirin 81mg daily. (Please notify your physician if you develop 

any abnormal bleeding such as blood in your stool and your urine)


Follow a low cholesterol diet.


Starting on Lipitor 40mg daily (your physician will check your liver enzymes in 

1 -2 weeks after starting on Lipitor.











Risk Factors for Stroke:





You can reduce your chances of stroke by working with your medical provider to 

adopt a healthy lifestyle.  Some specific ways to lower your chance of stroke 

are:





* If you are a smoker, now is the time to stop smoking cigarettes


* If you are diabetic, improve the control of your blood sugars


* Avoid excessive amounts of alcohol


* Control high blood pressure


* Lose weight if you are overweight


* Be sure to lead an active lifestyle


* Eat a healthy diet low in salt, cholesterol and fat





You should know about other risk factors for stroke that you are unable to 

control.  These include:





* Age 55 years or older


* Male gender


* Certain racial groups: ,  or /


* Family History of Stroke, Mini stroke or Heart Attack


* Sickle Cell Disease





Follow Up:





It is important for you to keep your follow up appointments with your medical 

provider.





Current Hospital Diet


Patient's current hospital diet: AHA Diet (Heart Healthy)





Discharge Diet


Recommended Diet:  AHA Diet (Heart Healthy)





Pending Studies


Studies pending at discharge:  no





Laboratory Results





Hemoglobin A1c








Test


  18


09:38 Range/Units


 


 


Estimated Average Glucose 120   mg/dl


 


Hemoglobin A1c 5.8 H 4.5-5.6  %








Lipid Panel








Test


  18


06:06 Range/Units


 


 


Triglycerides Level 118  0-150  mg/dl


 


Cholesterol Level 228 H 0-200  mg/dl


 


HDL Cholesterol 60   mg/dl


 


Cholesterol/HDL Ratio 3.8   


 


LDL Cholesterol, Calculated 144   mg/dl











Medical Emergencies








.


Who to Call and When:





Medical Emergencies:  If at any time you feel your situation is an emergency, 

please call 911 immediately.





.





Non-Emergent Contact


Non-Emergency issues call your:  Primary Care Provider





.


.








"Provider Documentation" section prepared by Terry Holland.








.





Additional Copies To


Gurwinder Godinez D.O.

## 2018-04-18 ENCOUNTER — HOSPITAL ENCOUNTER (EMERGENCY)
Dept: HOSPITAL 45 - C.EDB | Age: 68
Discharge: HOME | End: 2018-04-18
Payer: COMMERCIAL

## 2018-04-18 VITALS
HEIGHT: 70 IN | HEIGHT: 70 IN | BODY MASS INDEX: 26.01 KG/M2 | WEIGHT: 181.66 LBS | BODY MASS INDEX: 26.01 KG/M2 | WEIGHT: 181.66 LBS

## 2018-04-18 VITALS — HEART RATE: 91 BPM | DIASTOLIC BLOOD PRESSURE: 79 MMHG | OXYGEN SATURATION: 97 % | SYSTOLIC BLOOD PRESSURE: 135 MMHG

## 2018-04-18 VITALS — OXYGEN SATURATION: 98 %

## 2018-04-18 VITALS — TEMPERATURE: 99.14 F

## 2018-04-18 DIAGNOSIS — E78.5: ICD-10-CM

## 2018-04-18 DIAGNOSIS — J06.9: Primary | ICD-10-CM

## 2018-04-18 DIAGNOSIS — I10: ICD-10-CM

## 2018-04-18 DIAGNOSIS — Z88.1: ICD-10-CM

## 2018-04-18 DIAGNOSIS — H81.09: ICD-10-CM

## 2018-04-18 DIAGNOSIS — Z88.2: ICD-10-CM

## 2018-04-18 DIAGNOSIS — E86.0: ICD-10-CM

## 2018-04-18 DIAGNOSIS — Z88.8: ICD-10-CM

## 2018-04-18 DIAGNOSIS — R42: ICD-10-CM

## 2018-04-18 LAB
ALBUMIN SERPL-MCNC: 3.4 GM/DL (ref 3.4–5)
ALP SERPL-CCNC: 79 U/L (ref 45–117)
ALT SERPL-CCNC: 20 U/L (ref 12–78)
AST SERPL-CCNC: 19 U/L (ref 15–37)
BASOPHILS # BLD: 0.02 K/UL (ref 0–0.2)
BASOPHILS NFR BLD: 0.4 %
BUN SERPL-MCNC: 11 MG/DL (ref 7–18)
CALCIUM SERPL-MCNC: 8.8 MG/DL (ref 8.5–10.1)
CO2 SERPL-SCNC: 27 MMOL/L (ref 21–32)
CREAT SERPL-MCNC: 0.88 MG/DL (ref 0.6–1.2)
EOS ABS #: 0.02 K/UL (ref 0–0.5)
EOSINOPHIL NFR BLD AUTO: 183 K/UL (ref 130–400)
FLUAV RNA SPEC QL NAA+PROBE: (no result)
FLUBV RNA SPEC QL NAA+PROBE: (no result)
GLUCOSE SERPL-MCNC: 103 MG/DL (ref 70–99)
HCT VFR BLD CALC: 41.8 % (ref 37–47)
HGB BLD-MCNC: 14.1 G/DL (ref 12–16)
IG#: 0 K/UL (ref 0–0.02)
IMM GRANULOCYTES NFR BLD AUTO: 32.2 %
LIPASE: 183 U/L (ref 73–393)
LYMPHOCYTES # BLD: 1.48 K/UL (ref 1.2–3.4)
MCH RBC QN AUTO: 29.5 PG (ref 25–34)
MCHC RBC AUTO-ENTMCNC: 33.7 G/DL (ref 32–36)
MCV RBC AUTO: 87.4 FL (ref 80–100)
MONO ABS #: 0.35 K/UL (ref 0.11–0.59)
MONOCYTES NFR BLD: 7.6 %
NEUT ABS #: 2.72 K/UL (ref 1.4–6.5)
NEUTROPHILS # BLD AUTO: 0.4 %
NEUTROPHILS NFR BLD AUTO: 59.4 %
PMV BLD AUTO: 11.2 FL (ref 7.4–10.4)
POTASSIUM SERPL-SCNC: 3.6 MMOL/L (ref 3.5–5.1)
PROT SERPL-MCNC: 7.4 GM/DL (ref 6.4–8.2)
RED CELL DISTRIBUTION WIDTH CV: 12.9 % (ref 11.5–14.5)
RED CELL DISTRIBUTION WIDTH SD: 41.6 FL (ref 36.4–46.3)
SODIUM SERPL-SCNC: 134 MMOL/L (ref 136–145)
WBC # BLD AUTO: 4.59 K/UL (ref 4.8–10.8)

## 2018-04-18 NOTE — EMERGENCY ROOM VISIT NOTE
History


Report prepared by Gavin:  Mj Neal


Under the Supervision of:  Dr. Devin Laura M.D.


First contact with patient:  09:47


Chief Complaint:  ILLNESS


Stated Complaint:  ILLNESS





History of Present Illness


The patient is a 68 year old female who presents to the Emergency Room with 

complaints of a persistent cough and upper respiratory congestion that began on 

Friday, 5 days ago. The patient states that her symptoms began as she was 

leaving the hospital following an evaluation for a TIA. The patient came to the 

ED initially for focal weakness in her right arm and leg. imaging of the brain 

did not show an acute stroke. She states that her cough and upper respiratory 

congestion has worsened since Friday, and her cough is now producing a mucous. 

She has also felt increasingly weak since Friday. Her  at bedside notes 

that he had to help lower the patient to the floor this morning as she was not 

able to hold her weight on her legs any longer. He was not able to get the 

patient off of the ground, so he phoned for EMS. The patient notes that this 

weakness has improved at this time when compared to how she felt earlier this 

morning. She has had a fever as high as 100.8 degrees last night. She denies 

any other shortness of breath, cough, chest pain, diarrhea, or vomiting.





   Source of History:  patient


   Onset:  5 days ago


   Position:  chest


   Quality:  other (Cough)


   Timing:  other (Persistent)


   Associated Symptoms:  + fevers, + weakness, No chest pain, No SOB, No nausea

, No vomiting, No diarrhea





Review of Systems


See HPI for pertinent positives and negatives.  A total of ten systems were 

reviewed and were otherwise negative.





Past Medical & Surgical


Medical Problems:


(1) Dyslipidemia


(2) Hypertension


(3) ICH (intracerebral hemorrhage)


(4) Mnire's disease


Surgical Problems:


(1) H/O dilation and curettage


(2) H/O tubal ligation


(3) Hx of appendectomy


(4) Hx of tonsillectomy








Family History





CVA


  BROTHER


FH: colon cancer


  FATHER


FH: pancreatic cancer


  MOTHER





Social History


Smoking Status:  Never Smoker


Alcohol Use:  none


Marital Status:  


Housing Status:  lives with family


Occupation Status:  employed





Current/Historical Medications


Scheduled


Ascorbic Acid (Vitamin C), 1,000 MG PO DAILY


Aspirin (Aspirin EC Low Dose), 81 MG PO QAM


Atorvastatin (Lipitor), 40 MG PO QAM


Calcium Carbonate-Cholecalcife (Caltrate 600+D), 1 TAB PO DAILY


Enalapril Maleate (Vasotec), 20 MG PO BID


Garlic (Garlic), 1 CAP PO BID


Hydrochlorothiazide (Hydrochlorothiazide), 25 MG PO DAILY


Lorazepam (Ativan), 0.5 MG PO AS DIRECTED


Mometasone Furoate (Nasal) (Mometasone Furoate), 2 SPRAY YUNIEL DAILY


Multiple Vitamin (Multivitamin), 1 TAB PO DAILY


Omega-3 Fatty Acids (Omega-3), 500 MG PO DAILY


Plant Sterols And Stanols (Cholest Off), 2 TAB PO DAILY


Potassium Chloride (Potassium Chloride Er), 10 MEQ PO BIDM





Scheduled PRN


Meclizine Hcl (Meclizine Hcl), 1 TAB PO TID PRN for Dizziness or Vertigo





Allergies


Coded Allergies:  


     Amoxicillin (Verified  Adverse Reaction, Intermediate, nausea, 10/16/15)


     Clavulanic Acid (Verified  Adverse Reaction, Intermediate, nausea, 10/16/15

)


     Prednisone (Verified  Adverse Reaction, Intermediate, nausea, 10/16/15)


     Sulfa Antibiotics (Verified  Adverse Reaction, Intermediate, nausea, 10/16/

15)


     Doxycycline (Unverified  Adverse Reaction, Unknown, nausea, 10/16/15)





Physical Exam


Vital Signs











  Date Time  Temp Pulse Resp B/P (MAP) Pulse Ox O2 Delivery O2 Flow Rate FiO2


 


4/18/18 12:03  91 18 135/79 97   


 


4/18/18 10:11  94  133/75 94   





  96  139/85    





  107  100/76    


 


4/18/18 09:28     98 Room Air  


 


4/18/18 09:25  98      


 


4/18/18 09:22 37.3 93 18 136/78 98 Room Air  











Physical Exam


GENERAL: Awake, alert, fatigued-appearing, in no distress


HENT: Normocephalic, atraumatic. Dry cracked mucous membranes. Boggy nasal 

turbinates bilaterally. 


EYES: Normal conjunctiva. Sclera non-icteric.


NECK: Supple. No nuchal rigidity. FROM. No JVD.


RESPIRATORY: There is scant rhonchi at the bases bilaterally, otherwise clear. 


CARDIAC: Regular rate, normal rhythm. Extremities warm and well perfused. 

Pulses equal.


ABDOMEN: Soft, non-distended. No tenderness to palpation. No rebound or 

guarding. No masses.


RECTAL: Deferred.


MUSCULOSKELETAL: Chest examination reveals no tenderness. The back is 

symmetrical on inspection without obvious abnormality. There is no CVA 

tenderness to palpation. No joint edema. 


LOWER EXTREMITIES: Calves are equal size bilaterally and non-tender. No edema. 

No discoloration. 


NEURO: Normal sensorium. No sensory or motor deficits noted. 5/5 strength and 

SILT x 4 ext. Normal cerebellar function with finger-to-nose, alternating palms

, heel-to-shin.


SKIN: No rash or jaundice noted.





Medical Decision & Procedures


ER Provider


Diagnostic Interpretation:


Radiology results as stated below per my review and radiologist interpretation: 





CHEST ONE VIEW PORTABLE





HISTORY:  68 years-old Female CHEST PAIN acute atypical chest pain





COMPARISON: Chest radiograph 4/13/2018





TECHNIQUE: Portable AP view of the chest





FINDINGS: 


Cardiac silhouette is within normal limits. Atherosclerosis of the aorta.


Sigmoidal scoliosis of the spine with degenerative changes. No pneumothorax,


pleural effusion, focal airspace consolidation or overt pulmonary edema.





IMPRESSION: No acute process. 











The above report was generated using voice recognition software. It may contain


grammatical, syntax or spelling errors.











Electronically signed by:  Rodolfo Franco M.D.


4/18/2018 10:30 AM





Dictated Date/Time:  4/18/2018 10:29 AM





Laboratory Results


4/18/18 09:30








Red Blood Count 4.78, Mean Corpuscular Volume 87.4, Mean Corpuscular Hemoglobin 

29.5, Mean Corpuscular Hemoglobin Concent 33.7, Mean Platelet Volume 11.2, 

Neutrophils (%) (Auto) 59.4, Lymphocytes (%) (Auto) 32.2, Monocytes (%) (Auto) 

7.6, Eosinophils (%) (Auto) 0.4, Basophils (%) (Auto) 0.4, Neutrophils # (Auto) 

2.72, Lymphocytes # (Auto) 1.48, Monocytes # (Auto) 0.35, Eosinophils # (Auto) 

0.02, Basophils # (Auto) 0.02





4/18/18 09:30

















Test


  4/18/18


09:30 4/18/18


10:10


 


White Blood Count


  4.59 K/uL


(4.8-10.8) 


 


 


Red Blood Count


  4.78 M/uL


(4.2-5.4) 


 


 


Hemoglobin


  14.1 g/dL


(12.0-16.0) 


 


 


Hematocrit 41.8 % (37-47)  


 


Mean Corpuscular Volume


  87.4 fL


() 


 


 


Mean Corpuscular Hemoglobin


  29.5 pg


(25-34) 


 


 


Mean Corpuscular Hemoglobin


Concent 33.7 g/dl


(32-36) 


 


 


Platelet Count


  183 K/uL


(130-400) 


 


 


Mean Platelet Volume


  11.2 fL


(7.4-10.4) 


 


 


Neutrophils (%) (Auto) 59.4 %  


 


Lymphocytes (%) (Auto) 32.2 %  


 


Monocytes (%) (Auto) 7.6 %  


 


Eosinophils (%) (Auto) 0.4 %  


 


Basophils (%) (Auto) 0.4 %  


 


Neutrophils # (Auto)


  2.72 K/uL


(1.4-6.5) 


 


 


Lymphocytes # (Auto)


  1.48 K/uL


(1.2-3.4) 


 


 


Monocytes # (Auto)


  0.35 K/uL


(0.11-0.59) 


 


 


Eosinophils # (Auto)


  0.02 K/uL


(0-0.5) 


 


 


Basophils # (Auto)


  0.02 K/uL


(0-0.2) 


 


 


RDW Standard Deviation


  41.6 fL


(36.4-46.3) 


 


 


RDW Coefficient of Variation


  12.9 %


(11.5-14.5) 


 


 


Immature Granulocyte % (Auto) 0.0 %  


 


Immature Granulocyte # (Auto)


  0.00 K/uL


(0.00-0.02) 


 


 


Anion Gap


  2.0 mmol/L


(3-11) 


 


 


Est Creatinine Clear Calc


Drug Dose 72.5 ml/min 


  


 


 


Estimated GFR (


American) 78.8 


  


 


 


Estimated GFR (Non-


American 68.0 


  


 


 


BUN/Creatinine Ratio 13.1 (10-20)  


 


Calcium Level


  8.8 mg/dl


(8.5-10.1) 


 


 


Total Bilirubin


  0.4 mg/dl


(0.2-1) 


 


 


Direct Bilirubin


  0.1 mg/dl


(0-0.2) 


 


 


Aspartate Amino Transf


(AST/SGOT) 19 U/L (15-37) 


  


 


 


Alanine Aminotransferase


(ALT/SGPT) 20 U/L (12-78) 


  


 


 


Alkaline Phosphatase


  79 U/L


() 


 


 


Total Protein


  7.4 gm/dl


(6.4-8.2) 


 


 


Albumin


  3.4 gm/dl


(3.4-5.0) 


 


 


Lipase


  183 U/L


() 


 


 


Influenza Type A (RT-PCR)


  


  Neg for Influ


A (NEG)


 


Influenza Type B (RT-PCR)


  


  Neg for Influ


B (NEG)





Laboratory results reviewed by me





Medications Administered











 Medications


  (Trade)  Dose


 Ordered  Sig/Soha


 Route  Start Time


 Stop Time Status Last Admin


Dose Admin


 


 Sodium Chloride  2,000 ml @ 


 999 mls/hr  Q2H1M STAT


 IV  4/18/18 09:50


 4/18/18 11:50 DC 4/18/18 10:16


999 MLS/HR


 


 Ketorolac


 Tromethamine


  (Toradol Inj)  15 mg  NOW  STAT


 IV  4/18/18 09:50


 4/18/18 09:56 DC 4/18/18 10:16


15 MG


 


 Sodium Chloride


  (Ocean Nasal


 Spray)  2 sprays  NOW  ONCE


 NA  4/18/18 10:00


 4/18/18 10:01 DC 4/18/18 10:15


2 SPRAYS


 


 Albuterol/


 Ipratropium


  (Duoneb)  3 ml  NOW  STAT


 INH  4/18/18 09:50


 4/18/18 09:56 DC 4/18/18 10:16


3 ML


 


 Albuterol


  (Ventolin Hfa


 Inhaler)  2 puffs  NOW  STAT


 INH  4/18/18 11:33


 4/18/18 11:35 DC 4/18/18 11:46


2 PUFFS











ECG Per My Interpretation


Indication:  weakness


Rate (beats per minute):  93


Rhythm:  normal sinus


Findings:  other (Normal axis, no JESSICA/STD)





ED Course


0948: The patient was evaluated in room B8. A complete history and physical 

exam was performed.





0950: Ordered Duoneb 3 mL INH, Toradol 15 mg IV, Sodium Chloride 2000 mL @ 999 

mL/hr IV. 





1000: Ordered Sodium Chloride 2 sprays 





1129: I reevaluated the patient. She is feeling better. She will finish her IV 

fluids and be discharged home. 





1133: Ordered Albuterol 2 puffs INH. 





1153: The patient walked without difficulty at this time. The patient will be 

discharged home.





Medical Decision


I reviewed the patient's past medical history, medications, and the nursing 

notes as described above.





Differential diagnosis:


Etiologies such as viral syndrome, otitis, pharyngitis, pneumonia, influenza, 

meningitis, urinary tract infection, sepsis, bacteremia, as well as others were 

entertained.





The patient is a 68-year-old woman who presents emergency department with cough

, congestion, generalized weakness involving since her recent discharge last 

Friday after being admitted for a TIA workup per [facility].  On arrival the patient 

is fatigued appearing but no acute distress, afebrile stable vital signs.  The 

patient appears clinically dry.  She had positive orthostatic vital signs.  

Labs unremarkable.  Chest x-ray negative.  Flu negative.  Symptoms most likely 

related to a viral upper respiratory infection.  The patient was feeling 

improved after IV fluid hydration, saline nasal spray, DuoNeb. Findings and 

plan for follow-up reviewed with patient. Patient agreeable and d/c'd per 

discharge instructions.





Medication Reconcilliation


Current Medication List:  was personally reviewed by me





Blood Pressure Screening


Patient's blood pressure:  Normal blood pressure





Impression





 Primary Impression:  


 Upper respiratory infection


 Additional Impressions:  


 Orthostatic dizziness


 Dehydration





Scribe Attestation


The scribe's documentation has been prepared under my direction and personally 

reviewed by me in its entirety. I confirm that the note above accurately 

reflects all work, treatment, procedures, and medical decision making performed 

by me.





Departure Information


Dispostion


Home / Self-Care





Referrals


Gurwinder Godinez, D.O. (PCP)





Patient Instructions


ED Upper Resp Infec No  Abx Tx, My [facility]





Additional Instructions





Please follow up with your primary care physician in the next 1-3 days for re-

evaluation.





Your symptoms are likely due to an upper respiratory infection and associated 

dehydration.


Otherwise, your exam, EKG, chest xray, and lab results did not show signs of an 

emergent condition at this time.





Acetaminophen and Ibuprofen for pain and fevers as needed.


Saline nasal spray and mucinex to help thin a clear mucus as needed.


Use your albuterol inhaler 2 puffs every 4 hours for the next 48 hours and then 

as needed thereafter.


Drink plenty of fluids to ensure hydration.





Return to the emergency department for worsening symptoms as described in the 

accompanying instructions.





Problem Qualifiers

## 2018-04-18 NOTE — DIAGNOSTIC IMAGING REPORT
CHEST ONE VIEW PORTABLE



HISTORY:  68 years-old Female CHEST PAIN acute atypical chest pain



COMPARISON: Chest radiograph 4/13/2018



TECHNIQUE: Portable AP view of the chest



FINDINGS: 

Cardiac silhouette is within normal limits. Atherosclerosis of the aorta.

Sigmoidal scoliosis of the spine with degenerative changes. No pneumothorax,

pleural effusion, focal airspace consolidation or overt pulmonary edema.



IMPRESSION: No acute process. 







The above report was generated using voice recognition software. It may contain

grammatical, syntax or spelling errors.







Electronically signed by:  Rodolfo Franco M.D.

4/18/2018 10:30 AM



Dictated Date/Time:  4/18/2018 10:29 AM

## 2018-04-24 NOTE — EDITING REQUIRED CODING QUERY
CODING QUERY



To promote full compliance with coding requirements relating to patient care, provider 
participation is requested in all cases of  uncertainty.  Please assist us with the 
question(s) below:





Coding Question:  The following was documented in the chart: "RIGHT-SIDED WEAKNESS, R/O 
CVA, Possible related to TIA". Please clarify below to the best of your knowledge. Thank 
you so much for your help!    



() TIA, present on admission



() Stroke, present on admission



() TIA/Stroke, ruled-out



(x) Other, explain

Acute/Subacute left pontine infarct





Thank you!  

Tiffanie Gutierrez      







Principal Diagnosis: "_that condition established after study, to be chiefly responsible 
for occasioning the admission of the patient to the hospital for care."

Co-Existing Principal Diagnosis:  "_when two or more diagnoses equally meet the criteria 
for principal diagnosis as determined by the circumstances of admission, diagnostic work 
up, and/or therapy provided, and the Alphabetic Index, Tabular List, or another coding 
guideline does not provide sequencing direction, any one of the diagnoses may be sequenced 
first."

"When the physician has documented what appears to be a current diagnosis in the body of 
the record, but has not included the diagnosis in the final diagnostic statement, the 
physician should be asked whether the diagnosis should be added."

(Source [facility]oding [facility] 2 QTR90. p3-4)